# Patient Record
Sex: FEMALE | Race: WHITE | NOT HISPANIC OR LATINO | Employment: UNEMPLOYED | URBAN - METROPOLITAN AREA
[De-identification: names, ages, dates, MRNs, and addresses within clinical notes are randomized per-mention and may not be internally consistent; named-entity substitution may affect disease eponyms.]

---

## 2017-06-16 ENCOUNTER — CONVERSION ENCOUNTER (OUTPATIENT)
Dept: GENERAL RADIOLOGY | Facility: HOSPITAL | Age: 62
End: 2017-06-16

## 2017-06-28 ENCOUNTER — CONVERSION ENCOUNTER (OUTPATIENT)
Dept: MAMMOGRAPHY | Facility: HOSPITAL | Age: 62
End: 2017-06-28

## 2018-07-31 ENCOUNTER — CONVERSION ENCOUNTER (OUTPATIENT)
Dept: MAMMOGRAPHY | Facility: HOSPITAL | Age: 63
End: 2018-07-31

## 2019-01-18 ENCOUNTER — HOSPITAL ENCOUNTER (OUTPATIENT)
Dept: GENERAL RADIOLOGY | Facility: HOSPITAL | Age: 64
Discharge: HOME | End: 2019-01-18
Attending: NURSE PRACTITIONER

## 2019-01-18 LAB
CREAT BLD-MCNC: 0.9 MG/DL (ref 0.6–1.4)
GFR SERPLBLD BASED ON 1.73 SQ M-ARVRAT: >60 ML/MIN/{1.73_M2}

## 2019-01-31 ENCOUNTER — HOSPITAL ENCOUNTER (OUTPATIENT)
Dept: GENERAL RADIOLOGY | Facility: HOSPITAL | Age: 64
Discharge: HOME | End: 2019-01-31

## 2019-03-04 ENCOUNTER — HOSPITAL ENCOUNTER (OUTPATIENT)
Dept: OTHER | Facility: HOSPITAL | Age: 64
Discharge: HOME | End: 2019-03-04
Attending: INTERNAL MEDICINE

## 2019-03-04 LAB
ANION GAP SERPL CALC-SCNC: 16 MMOL/L (ref 8–19)
BASOPHILS # BLD AUTO: 0.02 10*3/UL (ref 0–0.2)
BASOPHILS NFR BLD AUTO: 0.6 % (ref 0–3)
BNP SERPL-MCNC: 120 PG/ML (ref 0–900)
BUN SERPL-MCNC: 16 MG/DL (ref 5–25)
BUN/CREAT SERPL: 17 {RATIO} (ref 6–20)
CALCIUM SERPL-MCNC: 9.8 MG/DL (ref 8.7–10.4)
CHLORIDE SERPL-SCNC: 102 MMOL/L (ref 99–111)
CONV ABS IMM GRAN: 0.01 10*3/UL (ref 0–0.2)
CONV CO2: 24 MMOL/L (ref 22–32)
CONV IMMATURE GRAN: 0.3 % (ref 0–1.8)
CONV RHEUMATOID FACTOR IGM: <10 [IU]/ML (ref 0–14)
CREAT UR-MCNC: 0.96 MG/DL (ref 0.5–0.9)
CRP SERPL-MCNC: 1.7 MG/L (ref 0–5)
DEPRECATED RDW RBC AUTO: 42.4 FL (ref 36.4–46.3)
EOSINOPHIL # BLD AUTO: 0.11 10*3/UL (ref 0–0.7)
EOSINOPHIL # BLD AUTO: 3.1 % (ref 0–7)
ERYTHROCYTE [DISTWIDTH] IN BLOOD BY AUTOMATED COUNT: 11.9 % (ref 11.7–14.4)
ERYTHROCYTE [SEDIMENTATION RATE] IN BLOOD: 6 MM/H (ref 0–30)
GFR SERPLBLD BASED ON 1.73 SQ M-ARVRAT: >60 ML/MIN/{1.73_M2}
GLUCOSE SERPL-MCNC: 87 MG/DL (ref 65–99)
HBA1C MFR BLD: 13.8 G/DL (ref 12–16)
HCT VFR BLD AUTO: 40.9 % (ref 37–47)
LYMPHOCYTES # BLD AUTO: 1.54 10*3/UL (ref 1–5)
MCH RBC QN AUTO: 32.8 PG (ref 27–31)
MCHC RBC AUTO-ENTMCNC: 33.7 G/DL (ref 33–37)
MCV RBC AUTO: 97.1 FL (ref 81–99)
MONOCYTES # BLD AUTO: 0.36 10*3/UL (ref 0.2–1.2)
MONOCYTES NFR BLD AUTO: 10.1 % (ref 3–10)
NEUTROPHILS # BLD AUTO: 1.51 10*3/UL (ref 2–8)
NEUTROPHILS NFR BLD AUTO: 42.5 % (ref 30–85)
NRBC CBCN: 0 % (ref 0–0.7)
OSMOLALITY SERPL CALC.SUM OF ELEC: 285 MOSM/KG (ref 273–304)
PLATELET # BLD AUTO: 211 10*3/UL (ref 130–400)
PMV BLD AUTO: 9.9 FL (ref 9.4–12.3)
POTASSIUM SERPL-SCNC: 4.9 MMOL/L (ref 3.5–5.3)
RBC # BLD AUTO: 4.21 10*6/UL (ref 4.2–5.4)
SODIUM SERPL-SCNC: 137 MMOL/L (ref 135–147)
T4 FREE SERPL-MCNC: 1.1 NG/DL (ref 0.9–1.8)
TSH SERPL-ACNC: 3.76 M[IU]/L (ref 0.27–4.2)
VARIANT LYMPHS NFR BLD MANUAL: 43.4 % (ref 20–45)
WBC # BLD AUTO: 3.55 10*3/UL (ref 4.8–10.8)

## 2019-03-05 LAB
ALDOLASE SERPL-CCNC: 7.8 U/L (ref 3.3–10.3)
CONV ANTI NUCLEAR ANTIBODY WITH REFLEX: NEGATIVE
DSDNA AB SER-ACNC: 6 IU/ML (ref 0–9)

## 2019-03-06 LAB — CCP IGA+IGG SERPL IA-ACNC: 14 UNITS (ref 0–19)

## 2019-07-31 ENCOUNTER — HOSPITAL ENCOUNTER (OUTPATIENT)
Dept: GENERAL RADIOLOGY | Facility: HOSPITAL | Age: 64
Discharge: HOME | End: 2019-07-31

## 2019-08-08 ENCOUNTER — HOSPITAL ENCOUNTER (OUTPATIENT)
Dept: OTHER | Facility: HOSPITAL | Age: 64
Discharge: HOME | End: 2019-08-08
Attending: INTERNAL MEDICINE

## 2019-08-08 LAB
ALBUMIN SERPL-MCNC: 4.6 G/DL (ref 3.5–5)
ALBUMIN/GLOB SERPL: 1.6 {RATIO} (ref 1.4–2.6)
ALP SERPL-CCNC: 53 U/L (ref 43–160)
ALT SERPL-CCNC: 196 U/L (ref 10–40)
ANION GAP SERPL CALC-SCNC: 21 MMOL/L (ref 8–19)
AST SERPL-CCNC: 98 U/L (ref 15–50)
BILIRUB SERPL-MCNC: 0.38 MG/DL (ref 0.2–1.3)
BUN SERPL-MCNC: 10 MG/DL (ref 5–25)
BUN/CREAT SERPL: 11 {RATIO} (ref 6–20)
CALCIUM SERPL-MCNC: 9.6 MG/DL (ref 8.7–10.4)
CHLORIDE SERPL-SCNC: 103 MMOL/L (ref 99–111)
CHOLEST SERPL-MCNC: 185 MG/DL (ref 107–200)
CHOLEST/HDLC SERPL: 4.1 {RATIO} (ref 3–6)
CONV CO2: 22 MMOL/L (ref 22–32)
CONV TOTAL PROTEIN: 7.5 G/DL (ref 6.3–8.2)
CREAT UR-MCNC: 0.93 MG/DL (ref 0.5–0.9)
GFR SERPLBLD BASED ON 1.73 SQ M-ARVRAT: >60 ML/MIN/{1.73_M2}
GLOBULIN UR ELPH-MCNC: 2.9 G/DL (ref 2–3.5)
GLUCOSE SERPL-MCNC: 101 MG/DL (ref 65–99)
HDLC SERPL-MCNC: 45 MG/DL (ref 40–60)
LDLC SERPL CALC-MCNC: 120 MG/DL (ref 70–100)
OSMOLALITY SERPL CALC.SUM OF ELEC: 291 MOSM/KG (ref 273–304)
POTASSIUM SERPL-SCNC: 4.9 MMOL/L (ref 3.5–5.3)
SODIUM SERPL-SCNC: 141 MMOL/L (ref 135–147)
TRIGL SERPL-MCNC: 101 MG/DL (ref 40–150)
TSH SERPL-ACNC: 3.19 M[IU]/L (ref 0.27–4.2)
VLDLC SERPL-MCNC: 20 MG/DL (ref 5–37)

## 2019-08-09 LAB — CONV THYROXINE TOTAL: 7.2 UG/DL (ref 4.5–12)

## 2019-08-16 ENCOUNTER — HOSPITAL ENCOUNTER (OUTPATIENT)
Dept: GENERAL RADIOLOGY | Facility: HOSPITAL | Age: 64
Discharge: HOME | End: 2019-08-16

## 2019-08-22 ENCOUNTER — HOSPITAL ENCOUNTER (OUTPATIENT)
Dept: GENERAL RADIOLOGY | Facility: HOSPITAL | Age: 64
Discharge: HOME | End: 2019-08-22
Attending: NURSE PRACTITIONER

## 2019-10-10 ENCOUNTER — HOSPITAL ENCOUNTER (OUTPATIENT)
Dept: OTHER | Facility: HOSPITAL | Age: 64
Setting detail: RECURRING SERIES
Discharge: HOME | End: 2019-10-10
Attending: INTERNAL MEDICINE

## 2020-01-29 LAB — GLUCOSE BLD-MCNC: 67 MG/DL (ref 65–99)

## 2020-06-23 ENCOUNTER — HOSPITAL ENCOUNTER (OUTPATIENT)
Dept: LAB | Facility: HOSPITAL | Age: 65
Discharge: HOME | End: 2020-06-23
Attending: INTERNAL MEDICINE

## 2020-06-23 LAB
ALBUMIN SERPL-MCNC: 4.6 G/DL (ref 3.5–5)
ALBUMIN/GLOB SERPL: 1.5 {RATIO} (ref 1.4–2.6)
ALP SERPL-CCNC: 48 U/L (ref 43–160)
ALT SERPL-CCNC: 122 U/L (ref 10–40)
ANION GAP SERPL CALC-SCNC: 17 MMOL/L (ref 8–19)
AST SERPL-CCNC: 75 U/L (ref 15–50)
BILIRUB SERPL-MCNC: 0.54 MG/DL (ref 0.2–1.3)
BUN SERPL-MCNC: 15 MG/DL (ref 5–25)
BUN/CREAT SERPL: 16 {RATIO} (ref 6–20)
CALCIUM SERPL-MCNC: 9.6 MG/DL (ref 8.7–10.4)
CHLORIDE SERPL-SCNC: 101 MMOL/L (ref 99–111)
CHOLEST SERPL-MCNC: 131 MG/DL (ref 107–200)
CHOLEST/HDLC SERPL: 3.4 {RATIO} (ref 3–6)
CONV CO2: 25 MMOL/L (ref 22–32)
CONV TOTAL PROTEIN: 7.7 G/DL (ref 6.3–8.2)
CREAT UR-MCNC: 0.92 MG/DL (ref 0.5–0.9)
GFR SERPLBLD BASED ON 1.73 SQ M-ARVRAT: >60 ML/MIN/{1.73_M2}
GLOBULIN UR ELPH-MCNC: 3.1 G/DL (ref 2–3.5)
GLUCOSE SERPL-MCNC: 94 MG/DL (ref 65–99)
HDLC SERPL-MCNC: 38 MG/DL (ref 40–60)
LDLC SERPL CALC-MCNC: 73 MG/DL (ref 70–100)
OSMOLALITY SERPL CALC.SUM OF ELEC: 287 MOSM/KG (ref 273–304)
POTASSIUM SERPL-SCNC: 4.6 MMOL/L (ref 3.5–5.3)
SODIUM SERPL-SCNC: 138 MMOL/L (ref 135–147)
TRIGL SERPL-MCNC: 100 MG/DL (ref 40–150)
VLDLC SERPL-MCNC: 20 MG/DL (ref 5–37)

## 2020-08-17 ENCOUNTER — HOSPITAL ENCOUNTER (OUTPATIENT)
Dept: GENERAL RADIOLOGY | Facility: HOSPITAL | Age: 65
Discharge: HOME | End: 2020-08-17
Attending: NURSE PRACTITIONER

## 2020-08-19 ENCOUNTER — HOSPITAL ENCOUNTER (OUTPATIENT)
Dept: ULTRASOUND IMAGING | Facility: HOSPITAL | Age: 65
Discharge: HOME | End: 2020-08-19
Attending: NURSE PRACTITIONER

## 2020-08-24 ENCOUNTER — HOSPITAL ENCOUNTER (OUTPATIENT)
Dept: GENERAL RADIOLOGY | Facility: HOSPITAL | Age: 65
Discharge: HOME | End: 2020-08-24
Attending: NURSE PRACTITIONER

## 2020-10-21 ENCOUNTER — OFFICE VISIT CONVERTED (OUTPATIENT)
Dept: NEUROSURGERY | Facility: CLINIC | Age: 65
End: 2020-10-21
Attending: PHYSICIAN ASSISTANT

## 2020-11-03 ENCOUNTER — HOSPITAL ENCOUNTER (OUTPATIENT)
Dept: LAB | Facility: HOSPITAL | Age: 65
Discharge: HOME | End: 2020-11-03
Attending: PHYSICIAN ASSISTANT

## 2020-11-03 LAB
25(OH)D3 SERPL-MCNC: 48.3 NG/ML (ref 30–100)
ALBUMIN SERPL-MCNC: 4.5 G/DL (ref 3.5–5)
ALBUMIN/GLOB SERPL: 1.3 {RATIO} (ref 1.4–2.6)
ALP SERPL-CCNC: 40 U/L (ref 43–160)
ALT SERPL-CCNC: 69 U/L (ref 10–40)
ANION GAP SERPL CALC-SCNC: 16 MMOL/L (ref 8–19)
AST SERPL-CCNC: 51 U/L (ref 15–50)
BASOPHILS # BLD AUTO: 0.02 10*3/UL (ref 0–0.2)
BASOPHILS NFR BLD AUTO: 0.4 % (ref 0–3)
BILIRUB SERPL-MCNC: 0.42 MG/DL (ref 0.2–1.3)
BUN SERPL-MCNC: 11 MG/DL (ref 5–25)
BUN/CREAT SERPL: 9 {RATIO} (ref 6–20)
CALCIUM SERPL-MCNC: 10.5 MG/DL (ref 8.7–10.4)
CHLORIDE SERPL-SCNC: 99 MMOL/L (ref 99–111)
CHOLEST SERPL-MCNC: 191 MG/DL (ref 107–200)
CHOLEST/HDLC SERPL: 4.2 {RATIO} (ref 3–6)
CONV ABS IMM GRAN: 0.02 10*3/UL (ref 0–0.2)
CONV CO2: 27 MMOL/L (ref 22–32)
CONV CREATININE URINE, RANDOM: 154.9 MG/DL (ref 10–300)
CONV IMMATURE GRAN: 0.4 % (ref 0–1.8)
CONV MICROALBUM.,U,RANDOM: <12 MG/L (ref 0–20)
CONV TOTAL PROTEIN: 7.9 G/DL (ref 6.3–8.2)
CREAT UR-MCNC: 1.17 MG/DL (ref 0.5–0.9)
DEPRECATED RDW RBC AUTO: 42.8 FL (ref 36.4–46.3)
EOSINOPHIL # BLD AUTO: 0.14 10*3/UL (ref 0–0.7)
EOSINOPHIL # BLD AUTO: 2.8 % (ref 0–7)
ERYTHROCYTE [DISTWIDTH] IN BLOOD BY AUTOMATED COUNT: 11.9 % (ref 11.7–14.4)
EST. AVERAGE GLUCOSE BLD GHB EST-MCNC: 126 MG/DL
GFR SERPLBLD BASED ON 1.73 SQ M-ARVRAT: 49 ML/MIN/{1.73_M2}
GLOBULIN UR ELPH-MCNC: 3.4 G/DL (ref 2–3.5)
GLUCOSE SERPL-MCNC: 98 MG/DL (ref 65–99)
HBA1C MFR BLD: 6 % (ref 3.5–5.7)
HCT VFR BLD AUTO: 44.1 % (ref 37–47)
HDLC SERPL-MCNC: 45 MG/DL (ref 40–60)
HGB BLD-MCNC: 14.7 G/DL (ref 12–16)
LDLC SERPL CALC-MCNC: 114 MG/DL (ref 70–100)
LYMPHOCYTES # BLD AUTO: 1.69 10*3/UL (ref 1–5)
LYMPHOCYTES NFR BLD AUTO: 33.2 % (ref 20–45)
MCH RBC QN AUTO: 32.5 PG (ref 27–31)
MCHC RBC AUTO-ENTMCNC: 33.3 G/DL (ref 33–37)
MCV RBC AUTO: 97.6 FL (ref 81–99)
MICROALBUMIN/CREAT UR: 7.7 MG/G{CRE} (ref 0–35)
MONOCYTES # BLD AUTO: 0.4 10*3/UL (ref 0.2–1.2)
MONOCYTES NFR BLD AUTO: 7.9 % (ref 3–10)
NEUTROPHILS # BLD AUTO: 2.82 10*3/UL (ref 2–8)
NEUTROPHILS NFR BLD AUTO: 55.3 % (ref 30–85)
NRBC CBCN: 0 % (ref 0–0.7)
OSMOLALITY SERPL CALC.SUM OF ELEC: 283 MOSM/KG (ref 273–304)
PLATELET # BLD AUTO: 237 10*3/UL (ref 130–400)
PMV BLD AUTO: 10.1 FL (ref 9.4–12.3)
POTASSIUM SERPL-SCNC: 4.6 MMOL/L (ref 3.5–5.3)
RBC # BLD AUTO: 4.52 10*6/UL (ref 4.2–5.4)
SODIUM SERPL-SCNC: 137 MMOL/L (ref 135–147)
T4 FREE SERPL-MCNC: 1 NG/DL (ref 0.9–1.8)
TRIGL SERPL-MCNC: 161 MG/DL (ref 40–150)
TSH SERPL-ACNC: 4.04 M[IU]/L (ref 0.27–4.2)
VLDLC SERPL-MCNC: 32 MG/DL (ref 5–37)
WBC # BLD AUTO: 5.09 10*3/UL (ref 4.8–10.8)

## 2020-11-06 ENCOUNTER — HOSPITAL ENCOUNTER (OUTPATIENT)
Dept: GENERAL RADIOLOGY | Facility: HOSPITAL | Age: 65
Discharge: HOME | End: 2020-11-06
Attending: PHYSICIAN ASSISTANT

## 2020-11-10 ENCOUNTER — OFFICE VISIT CONVERTED (OUTPATIENT)
Dept: NEUROSURGERY | Facility: CLINIC | Age: 65
End: 2020-11-10
Attending: NEUROLOGICAL SURGERY

## 2021-03-02 ENCOUNTER — HOSPITAL ENCOUNTER (OUTPATIENT)
Dept: LAB | Facility: HOSPITAL | Age: 66
Discharge: HOME | End: 2021-03-02
Attending: INTERNAL MEDICINE

## 2021-03-02 LAB
ALBUMIN SERPL-MCNC: 4.5 G/DL (ref 3.5–5)
ALBUMIN/GLOB SERPL: 1.3 {RATIO} (ref 1.4–2.6)
ALP SERPL-CCNC: 44 U/L (ref 43–160)
ALT SERPL-CCNC: 36 U/L (ref 10–40)
ANION GAP SERPL CALC-SCNC: 14 MMOL/L (ref 8–19)
AST SERPL-CCNC: 29 U/L (ref 15–50)
BILIRUB SERPL-MCNC: 0.28 MG/DL (ref 0.2–1.3)
BUN SERPL-MCNC: 17 MG/DL (ref 5–25)
BUN/CREAT SERPL: 19 {RATIO} (ref 6–20)
CALCIUM SERPL-MCNC: 9.8 MG/DL (ref 8.7–10.4)
CHLORIDE SERPL-SCNC: 102 MMOL/L (ref 99–111)
CHOLEST SERPL-MCNC: 208 MG/DL (ref 107–200)
CHOLEST/HDLC SERPL: 4.1 {RATIO} (ref 3–6)
CONV CO2: 28 MMOL/L (ref 22–32)
CONV TOTAL PROTEIN: 8.1 G/DL (ref 6.3–8.2)
CREAT UR-MCNC: 0.9 MG/DL (ref 0.5–0.9)
GFR SERPLBLD BASED ON 1.73 SQ M-ARVRAT: >60 ML/MIN/{1.73_M2}
GLOBULIN UR ELPH-MCNC: 3.6 G/DL (ref 2–3.5)
GLUCOSE SERPL-MCNC: 87 MG/DL (ref 65–99)
HDLC SERPL-MCNC: 51 MG/DL (ref 40–60)
LDLC SERPL CALC-MCNC: 128 MG/DL (ref 70–100)
OSMOLALITY SERPL CALC.SUM OF ELEC: 291 MOSM/KG (ref 273–304)
POTASSIUM SERPL-SCNC: 4.3 MMOL/L (ref 3.5–5.3)
SODIUM SERPL-SCNC: 140 MMOL/L (ref 135–147)
TRIGL SERPL-MCNC: 146 MG/DL (ref 40–150)
VLDLC SERPL-MCNC: 29 MG/DL (ref 5–37)

## 2021-03-03 ENCOUNTER — HOSPITAL ENCOUNTER (OUTPATIENT)
Dept: LAB | Facility: HOSPITAL | Age: 66
Discharge: HOME | End: 2021-03-03
Attending: PHYSICIAN ASSISTANT

## 2021-03-03 LAB
25(OH)D3 SERPL-MCNC: 43.1 NG/ML (ref 30–100)
BASOPHILS # BLD AUTO: 0.02 10*3/UL (ref 0–0.2)
BASOPHILS NFR BLD AUTO: 0.4 % (ref 0–3)
CONV ABS IMM GRAN: 0.02 10*3/UL (ref 0–0.2)
CONV IMMATURE GRAN: 0.4 % (ref 0–1.8)
DEPRECATED RDW RBC AUTO: 42.5 FL (ref 36.4–46.3)
EOSINOPHIL # BLD AUTO: 0.17 10*3/UL (ref 0–0.7)
EOSINOPHIL # BLD AUTO: 3.3 % (ref 0–7)
ERYTHROCYTE [DISTWIDTH] IN BLOOD BY AUTOMATED COUNT: 11.9 % (ref 11.7–14.4)
EST. AVERAGE GLUCOSE BLD GHB EST-MCNC: 111 MG/DL
HBA1C MFR BLD: 5.5 % (ref 3.5–5.7)
HCT VFR BLD AUTO: 43 % (ref 37–47)
HGB BLD-MCNC: 14.2 G/DL (ref 12–16)
LYMPHOCYTES # BLD AUTO: 1.58 10*3/UL (ref 1–5)
LYMPHOCYTES NFR BLD AUTO: 30.9 % (ref 20–45)
MCH RBC QN AUTO: 32.1 PG (ref 27–31)
MCHC RBC AUTO-ENTMCNC: 33 G/DL (ref 33–37)
MCV RBC AUTO: 97.3 FL (ref 81–99)
MONOCYTES # BLD AUTO: 0.44 10*3/UL (ref 0.2–1.2)
MONOCYTES NFR BLD AUTO: 8.6 % (ref 3–10)
NEUTROPHILS # BLD AUTO: 2.88 10*3/UL (ref 2–8)
NEUTROPHILS NFR BLD AUTO: 56.4 % (ref 30–85)
NRBC CBCN: 0 % (ref 0–0.7)
PLATELET # BLD AUTO: 221 10*3/UL (ref 130–400)
PMV BLD AUTO: 9.8 FL (ref 9.4–12.3)
RBC # BLD AUTO: 4.42 10*6/UL (ref 4.2–5.4)
T4 FREE SERPL-MCNC: 1.1 NG/DL (ref 0.9–1.8)
TSH SERPL-ACNC: 2.96 M[IU]/L (ref 0.27–4.2)
WBC # BLD AUTO: 5.11 10*3/UL (ref 4.8–10.8)

## 2021-05-10 NOTE — H&P
History and Physical      Patient Name: Gloria Stewart   Patient ID: [id number]   Sex: Female   YOB: 1955        Visit Date: October 21, 2020    Provider: Scarlett Aceves PA-C   Location: [facility]   Location Address: [address] [place] [address]  [id number]   Location Phone: [phone]          Chief Complaint  · Neck Pain with Headaches      History Of Present Illness  The patient is a 64 year old /White female, who presents on referral from Nayeli ALEXIS, for a neurosurgical evaluation for a history of neck pain and paresthesias.   The neck pain is localized to the posterior cervical region and has been present for 2 years. It is 10/10 in severity and radiates into the right and left arm in a non-specific distribution. The pain is described as being constant and it is generally worse at night. She is having difficulty with sleep due to the pain. The patient states the pain is aggravated by raising arms above head. She reports the pain is alleviated by rest and lying down. The paresthesias developed 6 months ago and are localized to the bilateral C6, C8, and non-specific dermatomal distribution.   The onset of the symptoms was not associated with any specific event or activity.   She has no additional symptoms. The patient's past medical history is notable for prior cervical spine surgery. The patient underwent an ACDF at C5-6 approximately 10 years ago.   RECENT INTERVENTIONS:  She reports undergoing recent physical therapy. The physical therapy has not helped.   INFORMATION REVIEWED:  The following information was reviewed: radiology reports and radiographic images. The cervical X-rays revealed C5/6 fusion. C4/5 DDD with spur at C4..      Pt is also have headaches that originate from neck.       Past Medical History  Cervicalgia; Diabetes mellitus; Hypertension; Lumbago         Past Surgical History  Cervical Fusion; Cholecystectomy;  "Discectomy; Hysterectomy; Stomach surgery         Medication List  Dyazide 37.5-25 mg oral capsule; lisinopril 10 mg oral tablet; Ozempic 0.25 mg or 0.5 mg(2 mg/1.5 mL) subcutaneous pen injector; pravastatin 20 mg oral tablet; Toprol XL 50 mg oral tablet extended release 24 hr         Allergy List  NO KNOWN DRUG ALLERGIES         Family Medical History  Family history of heart disease         Social History  Tobacco (Never)         Review of Systems  · Constitutional  o Denies  o : chills, excessive sweating, fatigue, fever, sycope/passing out, weight gain, weight loss  · Eyes  o Denies  o : changes in vision, blurry vision, double vision  · HENT  o Denies  o : loss of hearing, ringing in the ears, ear aches, sore throat, nasal congestion, sinus pain, nose bleeds, seasonal allergies  · Cardiovascular  o Denies  o : blood clots, swollen legs, anemia, easy burising or bleeding, transfusions  · Respiratory  o Admits  o : shortness of breath  o Denies  o : dry cough, productive cough, pneumonia, COPD  · Gastrointestinal  o Denies  o : difficulty swallowing, reflux  · Genitourinary  o Denies  o : incontinence  · Neurologic  o Admits  o : headache, loss of balance, difficulty with sleep, numbness/tingling/paresthesia   o Denies  o : seizure, stroke, tremor, falls, dizziness/vertigo, difficulty with coordination, difficulty with dexterity, weakness  · Musculoskeletal  o Admits  o : neck stiffness/pain, muscle aches, low back pain  o Denies  o : swollen lymph nodes, joint pain, weakness, spasms, sciatica, pain radiating in arm, pain radiating in leg  · Endocrine  o Denies  o : diabetes, thyroid disorder  · Psychiatric  o Denies  o : anxiety, depression      Vitals  Date Time BP Position Site L\R Cuff Size HR RR TEMP (F) WT  HT  BMI kg/m2 BSA m2 O2 Sat FR L/min FiO2 HC       10/21/2020 11:08 AM        96  5'  4\"   99 %            Physical Examination  · Constitutional  o Appearance  o : well-nourished, well developed, alert, " in no acute distress  · Neck  o Inspection/Palpation  o : normal appearance, no masses, trachea midline. Tenderness of Cspine paraspinals.   o Range of Motion  o : cervical range of motion within normal limits  · Respiratory  o Respiratory Effort  o : breathing unlabored  · Cardiovascular  o Peripheral Vascular System  o :   § Extremities  § : no edema or cyanosis  · Musculoskeletal  o Spine  o :   § Stability  § : no subluxations present  § Muscle Strength/Tone  § : paraspinal muscle strength within normal limits, paraspinal muscle tone within normal limits  o Right Upper Extremity  o :   § Inspection/Palpation  § : no tenderness to palpation  § Joint Stability  § : shoulder, elbow and wrist joint stability normal  § Range of Motion  § : range of motion normal, no joint crepitus or pain with motion present,shoulder negative  o Left Upper Extremity  o :   § Inspection/Palpation  § : no tenderness to palpation  § Joint Stability  § : shoulder, elbow and wrist joint stability normal  § Range of Motion  § : range of motion normal, no joint crepitus present, no pain with joint motion, shoulder negative  · Skin and Subcutaneous Tissue  o Neck  o : no lesions or areas of discoloration  · Neurologic  o Mental Status Examination  o :   § Orientation  § : alert and oriented to person, place, time and events  o Motor Examination  o :   § RUE Strength  § : strength normal  § RUE Motor Function  § : tone normal, muscle bulk normal  § LUE Strength  § : strength normal  § LUE Motor Function  § : tone normal, muscle bulk normal  o Reflexes  o :   § RUE  § : 2/4 in biceps/triceps/brachioradialis, Bonilla sign negative  § LUE  § : 2/4 in biceps/triceps/brachioradialis, Bonilla sign negative  o Sensation  o :   § Light Touch  § : sensation intact to light touch in extremities  o Gait and Station  o :   § Gait Screening  § : normal gait, able to stand without difficulty  · Psychiatric  o Mood and Affect  o : mood normal, affect  appropriate              Assessment  · Cervicalgia     723.1/M54.2  · Cervical radiculopathy     723.4/M54.12      Plan  · Orders  o MRI cervical spine wo contrast (54015) - 723.1/M54.2, 723.4/M54.12 - 10/21/2020   MARBELLA   · Medications  o Medications have been Reconciled  o Transition of Care or Provider Policy  · Instructions  o Encouraged to follow-up with Primary Care Provider for preventative care.  o The ROS and the PFSH were reviewed at today's visit.  o I have discussed the risks and benefits of surgery versus physical therapy and other conservative forms of treatment.  o Handouts provided: Cervical Exercises.  o Call or return if symptoms worsen or persist.  o Will order MRI of Cspine and return afterwards. Has had previous surgery at C5/6.   o Electronically Identified Patient Education Materials Provided Electronically  · Disposition  o Call or Return if symptoms worsen or persist.            Electronically Signed by: Scarlett Aceves PA-C -Author on October 21, 2020 12:07:22 PM

## 2021-05-13 NOTE — PROGRESS NOTES
Progress Note      Patient Name: Gloria Stewart   Patient ID: [id number]   Sex: Female   YOB: 1955    Referring Provider: Nayeli Barrett [facility]    Visit Date: November 10, 2020    Provider: Wilson Priest MD   Location: [facility]   Location Address: [address] [place] [address]  [id number]   Location Phone: [phone]          Chief Complaint  · Follow-up     Here to discuss MRI results.       History Of Present Illness  The patient is a 64 year old /White female who is in the office for followup appointment. She is 10 years out from C5-6 fusion. She gets pain in the neck up to the head. She does have some pain into the thumb and numbness into the left greater than right forearm. Her neck pain keeps her up at night. She has spinal stenosis at C4-5 with questionable early signal change. She has headaches which appears to related to the neck pain.       Past Medical History  Cervicalgia; Diabetes mellitus; Hypertension; Lumbago         Past Surgical History  Cervical Fusion; Cholecystectomy; Discectomy; Hysterectomy; Stomach surgery         Medication List  lisinopril 10 mg oral tablet; Ozempic 0.25 mg or 0.5 mg(2 mg/1.5 mL) subcutaneous pen injector; pravastatin 20 mg oral tablet; Toprol XL 50 mg oral tablet extended release 24 hr         Allergy List  NO KNOWN DRUG ALLERGIES       Allergies Reconciled  Family Medical History  Family history of heart disease         Social History  Tobacco (Never)         Review of Systems  · Constitutional  o Denies  o : chills, excessive sweating, fatigue, fever, sycope/passing out, weight gain, weight loss  · Eyes  o Denies  o : changes in vision, blurry vision, double vision  · HENT  o Denies  o : loss of hearing, ringing in the ears, ear aches, sore throat, nasal congestion, sinus pain, nose bleeds, seasonal allergies  · Cardiovascular  o Denies  o : blood clots, swollen legs, anemia, easy burising or bleeding,  "transfusions  · Respiratory  o Denies  o : shortness of breath, dry cough, productive cough, pneumonia, COPD  · Gastrointestinal  o Denies  o : difficulty swallowing, reflux  · Genitourinary  o Denies  o : incontinence  · Neurologic  o Admits  o : headache, dizziness/vertigo, difficulty with sleep, numbness/tingling/paresthesia   o Denies  o : seizure, stroke, tremor, loss of balance, falls, difficulty with coordination, difficulty with dexterity, weakness  · Musculoskeletal  o Admits  o : neck stiffness/pain, low back pain  o Denies  o : swollen lymph nodes, muscle aches, joint pain, weakness, spasms, sciatica, pain radiating in arm, pain radiating in leg  · Endocrine  o Denies  o : diabetes, thyroid disorder  · Psychiatric  o Denies  o : anxiety, depression  · All Others Negative      Vitals  Date Time BP Position Site L\R Cuff Size HR RR TEMP (F) WT  HT  BMI kg/m2 BSA m2 O2 Sat FR L/min FiO2        11/10/2020 02:15 PM        97.6 162lbs 9oz 5'  4\" 27.9 1.82             Physical Examination  · Constitutional  o Appearance  o : well-nourished, well developed, alert, in no acute distress  · Neck  o Inspection/Palpation  o : normal appearance, no masses, trachea midline. Tenderness of Cspine paraspinals.   o Range of Motion  o : cervical range of motion within normal limits  · Respiratory  o Respiratory Effort  o : breathing unlabored  · Cardiovascular  o Peripheral Vascular System  o :   § Extremities  § : no edema or cyanosis  · Skin and Subcutaneous Tissue  o Neck  o : no lesions or areas of discoloration  · Psychiatric  o Mood and Affect  o : mood normal, affect appropriate     No Hoffmans or Clonus.   Reflexes 2/4.               Assessment  · Cervicalgia     723.1/M54.2  · Cervical radiculopathy     723.4/M54.12  · Cervical spinal stenosis     723.0/M48.02  C4-5 most notably (above fusion) with question of signal change without signs of myelopathy on physical exam.       Plan  · Medications  o Fioricet " -40 mg oral capsule   SIG: take 1 capsule by oral route every 8 hours as needed tension headache   DISP: (30) Capsule with 0 refills  Prescribed on 11/10/2020     o Medications have been Reconciled  o Transition of Care or Provider Policy  · Instructions  o The ROS and the PFSH were reviewed at today's visit.  o Call or return to office if symptoms worsen or persist.   o We will try fioricet for the tension type headaches.   o She currently has no signs of myelopathy on physical exam. We will see her back in 3 months to assure not developing any signs of myelopathy.   · Disposition  o Return to clinic in 3 months.            Electronically Signed by: Wilson Priest MD -Author on November 10, 2020 03:20:59 PM

## 2021-05-14 VITALS — HEIGHT: 64 IN | OXYGEN SATURATION: 99 % | TEMPERATURE: 96 F

## 2021-05-14 VITALS — TEMPERATURE: 97.6 F | WEIGHT: 162.56 LBS | BODY MASS INDEX: 27.75 KG/M2 | HEIGHT: 64 IN

## 2021-05-22 ENCOUNTER — TRANSCRIBE ORDERS (OUTPATIENT)
Dept: MAMMOGRAPHY | Facility: HOSPITAL | Age: 66
End: 2021-05-22

## 2021-05-22 DIAGNOSIS — Z12.39 BREAST SCREENING: Primary | ICD-10-CM

## 2021-08-19 ENCOUNTER — PREP FOR SURGERY (OUTPATIENT)
Dept: OTHER | Facility: HOSPITAL | Age: 66
End: 2021-08-19

## 2021-08-19 ENCOUNTER — OFFICE VISIT (OUTPATIENT)
Dept: NEUROSURGERY | Facility: CLINIC | Age: 66
End: 2021-08-19

## 2021-08-19 VITALS
SYSTOLIC BLOOD PRESSURE: 129 MMHG | BODY MASS INDEX: 28.42 KG/M2 | HEIGHT: 64 IN | WEIGHT: 166.5 LBS | DIASTOLIC BLOOD PRESSURE: 82 MMHG

## 2021-08-19 DIAGNOSIS — M48.02 SPINAL STENOSIS IN CERVICAL REGION: Primary | ICD-10-CM

## 2021-08-19 PROCEDURE — 99213 OFFICE O/P EST LOW 20 MIN: CPT | Performed by: NEUROLOGICAL SURGERY

## 2021-08-19 RX ORDER — METOPROLOL SUCCINATE 50 MG/1
2 TABLET, EXTENDED RELEASE ORAL EVERY EVENING
COMMUNITY
Start: 2021-08-17

## 2021-08-19 RX ORDER — SEMAGLUTIDE 1.34 MG/ML
0.2 INJECTION, SOLUTION SUBCUTANEOUS
COMMUNITY
End: 2022-03-14 | Stop reason: SDUPTHER

## 2021-08-19 RX ORDER — CEFAZOLIN SODIUM 2 G/100ML
2 INJECTION, SOLUTION INTRAVENOUS ONCE
Status: CANCELLED | OUTPATIENT
Start: 2021-08-19 | End: 2021-08-19

## 2021-08-19 RX ORDER — LISINOPRIL 10 MG/1
5 TABLET ORAL DAILY
COMMUNITY
Start: 2021-08-17 | End: 2022-08-17

## 2021-08-19 RX ORDER — PRAVASTATIN SODIUM 20 MG
TABLET ORAL
COMMUNITY
End: 2021-09-20

## 2021-08-19 NOTE — PROGRESS NOTES
"  Gloria Stewart is a 65 y.o. female that presents with Neck Pain       She has noticed worsening pain across the shoulders and feels \"like it is in the bone.\" She has significant continued headaches. Her worst pain is      Review of Systems   Musculoskeletal: Positive for arthralgias, back pain, myalgias, neck pain and neck stiffness.   Neurological: Positive for numbness.   All other systems reviewed and are negative.       Vitals:    08/19/21 1028   BP: 129/82        Physical Exam  Cardiovascular:      Comments: No edema  Pulmonary:      Effort: Pulmonary effort is normal.   Neurological:      Mental Status: She is alert.      Sensory: No sensory deficit.      Motor: No weakness.      Deep Tendon Reflexes: Reflexes normal (2/4, -Hoffmans).   Psychiatric:         Mood and Affect: Mood normal.             Assessment and Plan {CC Problem List  Visit Diagnosis  ROS  Review (Popup)  Forseva Maintenance  Quality  BestPractice  Medications  SmartSets  SnapShot Encounters  Media :23}   Problem List Items Addressed This Visit     None      Visit Diagnoses     Spinal stenosis in cervical region    -  Primary    C4-5 with questionable early myelomalacia. No myelopathy.        C4-5 ACDF would mainly help reduce her risk of spinal cord injury, but not likely the headache and neck pain.     She would like to proceed with ACDF.  Follow Up {Instructions Charge Capture  Follow-up Communications :23}   No follow-ups on file.  "

## 2021-08-26 ENCOUNTER — HOSPITAL ENCOUNTER (OUTPATIENT)
Dept: MAMMOGRAPHY | Facility: HOSPITAL | Age: 66
Discharge: HOME | End: 2021-08-26
Admitting: PHYSICIAN ASSISTANT

## 2021-08-26 DIAGNOSIS — Z12.39 BREAST SCREENING: ICD-10-CM

## 2021-08-26 PROCEDURE — 77067 SCR MAMMO BI INCL CAD: CPT | Performed by: RADIOLOGY

## 2021-08-26 PROCEDURE — 77063 BREAST TOMOSYNTHESIS BI: CPT

## 2021-08-26 PROCEDURE — 77063 BREAST TOMOSYNTHESIS BI: CPT | Performed by: RADIOLOGY

## 2021-08-26 PROCEDURE — 77067 SCR MAMMO BI INCL CAD: CPT

## 2021-09-14 ENCOUNTER — TELEPHONE (OUTPATIENT)
Dept: INTERNAL MEDICINE | Facility: CLINIC | Age: 66
End: 2021-09-14

## 2021-09-14 ENCOUNTER — LAB (OUTPATIENT)
Dept: LAB | Facility: HOSPITAL | Age: 66
End: 2021-09-14

## 2021-09-14 DIAGNOSIS — E78.5 HYPERLIPIDEMIA, UNSPECIFIED HYPERLIPIDEMIA TYPE: ICD-10-CM

## 2021-09-14 DIAGNOSIS — R53.83 FATIGUE, UNSPECIFIED TYPE: ICD-10-CM

## 2021-09-14 DIAGNOSIS — I10 HYPERTENSION, UNSPECIFIED TYPE: ICD-10-CM

## 2021-09-14 DIAGNOSIS — E55.9 VITAMIN D DEFICIENCY: ICD-10-CM

## 2021-09-14 DIAGNOSIS — I10 HYPERTENSION, UNSPECIFIED TYPE: Primary | ICD-10-CM

## 2021-09-14 LAB
25(OH)D3 SERPL-MCNC: 58.8 NG/ML
ALBUMIN SERPL-MCNC: 4.6 G/DL (ref 3.5–5.2)
ALBUMIN/GLOB SERPL: 1.7 G/DL
ALP SERPL-CCNC: 35 U/L (ref 39–117)
ALT SERPL W P-5'-P-CCNC: 21 U/L (ref 1–33)
ANION GAP SERPL CALCULATED.3IONS-SCNC: 8.5 MMOL/L (ref 5–15)
AST SERPL-CCNC: 20 U/L (ref 1–32)
BASOPHILS # BLD AUTO: 0.02 10*3/MM3 (ref 0–0.2)
BASOPHILS NFR BLD AUTO: 0.5 % (ref 0–1.5)
BILIRUB SERPL-MCNC: 0.4 MG/DL (ref 0–1.2)
BUN SERPL-MCNC: 13 MG/DL (ref 8–23)
BUN/CREAT SERPL: 14.4 (ref 7–25)
CALCIUM SPEC-SCNC: 9.5 MG/DL (ref 8.6–10.5)
CHLORIDE SERPL-SCNC: 101 MMOL/L (ref 98–107)
CHOLEST SERPL-MCNC: 172 MG/DL (ref 0–200)
CO2 SERPL-SCNC: 27.5 MMOL/L (ref 22–29)
CREAT SERPL-MCNC: 0.9 MG/DL (ref 0.57–1)
DEPRECATED RDW RBC AUTO: 42.4 FL (ref 37–54)
EOSINOPHIL # BLD AUTO: 0.09 10*3/MM3 (ref 0–0.4)
EOSINOPHIL NFR BLD AUTO: 2.1 % (ref 0.3–6.2)
ERYTHROCYTE [DISTWIDTH] IN BLOOD BY AUTOMATED COUNT: 11.9 % (ref 12.3–15.4)
GFR SERPL CREATININE-BSD FRML MDRD: 63 ML/MIN/1.73
GLOBULIN UR ELPH-MCNC: 2.7 GM/DL
GLUCOSE SERPL-MCNC: 82 MG/DL (ref 65–99)
HCT VFR BLD AUTO: 41.4 % (ref 34–46.6)
HDLC SERPL-MCNC: 42 MG/DL (ref 40–60)
HGB BLD-MCNC: 14 G/DL (ref 12–15.9)
IMM GRANULOCYTES # BLD AUTO: 0.01 10*3/MM3 (ref 0–0.05)
IMM GRANULOCYTES NFR BLD AUTO: 0.2 % (ref 0–0.5)
LDLC SERPL CALC-MCNC: 105 MG/DL (ref 0–100)
LDLC/HDLC SERPL: 2.42 {RATIO}
LYMPHOCYTES # BLD AUTO: 1.53 10*3/MM3 (ref 0.7–3.1)
LYMPHOCYTES NFR BLD AUTO: 36.2 % (ref 19.6–45.3)
MCH RBC QN AUTO: 32.7 PG (ref 26.6–33)
MCHC RBC AUTO-ENTMCNC: 33.8 G/DL (ref 31.5–35.7)
MCV RBC AUTO: 96.7 FL (ref 79–97)
MONOCYTES # BLD AUTO: 0.41 10*3/MM3 (ref 0.1–0.9)
MONOCYTES NFR BLD AUTO: 9.7 % (ref 5–12)
NEUTROPHILS NFR BLD AUTO: 2.17 10*3/MM3 (ref 1.7–7)
NEUTROPHILS NFR BLD AUTO: 51.3 % (ref 42.7–76)
NRBC BLD AUTO-RTO: 0 /100 WBC (ref 0–0.2)
PLATELET # BLD AUTO: 216 10*3/MM3 (ref 140–450)
PMV BLD AUTO: 9.8 FL (ref 6–12)
POTASSIUM SERPL-SCNC: 4.4 MMOL/L (ref 3.5–5.2)
PROT SERPL-MCNC: 7.3 G/DL (ref 6–8.5)
RBC # BLD AUTO: 4.28 10*6/MM3 (ref 3.77–5.28)
SODIUM SERPL-SCNC: 137 MMOL/L (ref 136–145)
T4 FREE SERPL-MCNC: 0.99 NG/DL (ref 0.93–1.7)
TRIGL SERPL-MCNC: 141 MG/DL (ref 0–150)
TSH SERPL DL<=0.05 MIU/L-ACNC: 2.28 UIU/ML (ref 0.27–4.2)
VLDLC SERPL-MCNC: 25 MG/DL (ref 5–40)
WBC # BLD AUTO: 4.23 10*3/MM3 (ref 3.4–10.8)

## 2021-09-14 PROCEDURE — 84443 ASSAY THYROID STIM HORMONE: CPT

## 2021-09-14 PROCEDURE — 85025 COMPLETE CBC W/AUTO DIFF WBC: CPT

## 2021-09-14 PROCEDURE — 84439 ASSAY OF FREE THYROXINE: CPT

## 2021-09-14 PROCEDURE — 82306 VITAMIN D 25 HYDROXY: CPT

## 2021-09-14 PROCEDURE — 80053 COMPREHEN METABOLIC PANEL: CPT

## 2021-09-14 PROCEDURE — 80061 LIPID PANEL: CPT

## 2021-09-14 PROCEDURE — 36415 COLL VENOUS BLD VENIPUNCTURE: CPT

## 2021-09-20 ENCOUNTER — OFFICE VISIT (OUTPATIENT)
Dept: INTERNAL MEDICINE | Facility: CLINIC | Age: 66
End: 2021-09-20

## 2021-09-20 VITALS
TEMPERATURE: 96.4 F | SYSTOLIC BLOOD PRESSURE: 138 MMHG | HEIGHT: 64 IN | HEART RATE: 69 BPM | WEIGHT: 169 LBS | BODY MASS INDEX: 28.85 KG/M2 | DIASTOLIC BLOOD PRESSURE: 80 MMHG | OXYGEN SATURATION: 98 %

## 2021-09-20 DIAGNOSIS — E55.9 VITAMIN D DEFICIENCY: ICD-10-CM

## 2021-09-20 DIAGNOSIS — M25.512 BILATERAL SHOULDER PAIN, UNSPECIFIED CHRONICITY: ICD-10-CM

## 2021-09-20 DIAGNOSIS — E78.2 MIXED HYPERLIPIDEMIA: Chronic | ICD-10-CM

## 2021-09-20 DIAGNOSIS — I10 ESSENTIAL HYPERTENSION: Chronic | ICD-10-CM

## 2021-09-20 DIAGNOSIS — M25.511 BILATERAL SHOULDER PAIN, UNSPECIFIED CHRONICITY: ICD-10-CM

## 2021-09-20 DIAGNOSIS — E11.9 TYPE 2 DIABETES MELLITUS WITHOUT COMPLICATION, UNSPECIFIED WHETHER LONG TERM INSULIN USE (HCC): Primary | Chronic | ICD-10-CM

## 2021-09-20 DIAGNOSIS — M48.02 SPINAL STENOSIS IN CERVICAL REGION: ICD-10-CM

## 2021-09-20 PROBLEM — E78.5 HYPERLIPIDEMIA: Status: ACTIVE | Noted: 2021-09-20

## 2021-09-20 PROBLEM — R00.0 TACHYCARDIA, UNSPECIFIED: Status: ACTIVE | Noted: 2019-08-30

## 2021-09-20 PROBLEM — Z98.1 S/P CERVICAL SPINAL FUSION: Status: ACTIVE | Noted: 2017-02-07

## 2021-09-20 PROBLEM — I26.99 PULMONARY EMBOLISM: Status: ACTIVE | Noted: 2019-08-30

## 2021-09-20 PROCEDURE — 99214 OFFICE O/P EST MOD 30 MIN: CPT | Performed by: NURSE PRACTITIONER

## 2021-09-20 RX ORDER — PROGESTERONE 100 MG/1
CAPSULE ORAL
COMMUNITY
End: 2021-09-20

## 2021-09-20 RX ORDER — METOPROLOL SUCCINATE 50 MG/1
TABLET, EXTENDED RELEASE ORAL EVERY 24 HOURS
COMMUNITY
End: 2021-09-20

## 2021-09-20 RX ORDER — DICLOFENAC SODIUM 75 MG/1
75 TABLET, DELAYED RELEASE ORAL
COMMUNITY
End: 2021-09-20

## 2021-09-20 RX ORDER — PRAVASTATIN SODIUM 20 MG
1 TABLET ORAL EVERY EVENING
COMMUNITY

## 2021-09-20 RX ORDER — ESTRADIOL 0.03 MG/D
FILM, EXTENDED RELEASE TRANSDERMAL
COMMUNITY
End: 2021-09-20

## 2021-09-20 RX ORDER — KETOCONAZOLE 20 MG/ML
SHAMPOO TOPICAL
COMMUNITY
Start: 2021-09-01 | End: 2022-08-17

## 2021-09-20 RX ORDER — HYDROCODONE BITARTRATE AND ACETAMINOPHEN 5; 325 MG/1; MG/1
TABLET ORAL
Qty: 20 TABLET | Refills: 0 | Status: SHIPPED | OUTPATIENT
Start: 2021-09-20 | End: 2022-03-14

## 2021-09-20 RX ORDER — SEMAGLUTIDE 1.34 MG/ML
INJECTION, SOLUTION SUBCUTANEOUS
COMMUNITY
End: 2022-03-14 | Stop reason: SDUPTHER

## 2021-09-20 RX ORDER — ONDANSETRON 4 MG/1
4 TABLET, ORALLY DISINTEGRATING ORAL EVERY 8 HOURS PRN
Qty: 15 TABLET | Refills: 3 | Status: SHIPPED | OUTPATIENT
Start: 2021-09-20 | End: 2021-12-21

## 2021-09-20 RX ORDER — TRIAMTERENE AND HYDROCHLOROTHIAZIDE 37.5; 25 MG/1; MG/1
CAPSULE ORAL
COMMUNITY
End: 2021-09-20

## 2021-09-20 RX ORDER — LISINOPRIL 10 MG/1
TABLET ORAL EVERY 24 HOURS
COMMUNITY
End: 2021-09-20

## 2021-09-20 NOTE — PROGRESS NOTES
Chief Complaint  Follow-up (right clavical pain, keep pt awake at night, pt cannot tolerate pain any longer )    Subjective    History of Present Illness:  Gloria Stewart presents to [facility] for complaint of right clavicle pain.  She states the pain is keeping her awake at night.    She is 10 years out from C5-6 fusion. She has had a Discectomy. Follows with Dr. Priest.    Objective   Vital Signs:   There were no vitals taken for this visit.      Physical Exam :  General: Well nourished, well hydrated, in no acute distress  HEENT: Conjunctiva clear, no exudate bilateral  Cardiovascular: S1 S2, regular rate and rhythm, no murmur  Respiratory: Clear to auscultation bilateral, no wheezes, rhonchi, or rales  Chest: Normal shape, no deformity, normal work of breathing  Musculoskeletal: No joint deformities, tenderness, or crepitations; full, active ROM without pain   Neurological: Alert, conversing normally  Psychological: Good eye contact, mood good, and judgment intact          Assessment and Plan:  Diagnoses and all orders for this visit:    1. Spinal stenosis in cervical region (Primary)          Follow Up:  Patient was given instructions and counseling regarding condition. No follow-ups on file.

## 2021-09-20 NOTE — PROGRESS NOTES
Chief Complaint  Follow-up (right clavical pain, keep pt awake at night, pt cannot tolerate pain any longer )    Subjective    History of Present Illness:  Gloria Stewart presents to [facility]  for follow-up chronic disease management.  The patient is not having any medication side effects. Recent labs were reviewed. Denies chest pain, shortness of air, abdominal pain, or change in bowel habits.    She is complaining of bilateral shoulder pain that has become severe in the last 2 days.  She has been unable to sleep due to the severity of pain.  She has had this pain for 9 months but it has been worsening.  She states that it is an ache and shooting pain.  She has used Tylenol with no relief. Positive for nausea when the pain is at its worse.  She states that Dr. Priest does not believe it is associated with her cervical disc problem.    Followed by Dr. Priest, Dr. Tay (refills pravastatin and metoprolol), sees gynecologist and uses compound hormones, and dermatologist.  Has had chronic kidney disease stage III and knows to avoid NSAIDs.  On Toprol XL for palpitations.  Family history of early MIs with 3 brothers at age 46, 51, and 62.  History of abnormal LFTs/fatty liver per ultrasound.  Status post hysterectomy 1994, status post neck and back surgery, status post gastric wrap, status post cholecystectomy.  Status post melanoma and sees dermatology regularly. Carotid Doppler scan 8/2020 no stenosis.  Colonoscopy 2017 and normal.  Tetanus vaccine 2020.    Component      Latest Ref Rng & Units 11/3/2020 3/2/2021 9/14/2021   Total Cholesterol      0 - 200 mg/dL 191 208 (H) 172   Triglycerides      0 - 150 mg/dL 161 (H) 146 141   HDL Cholesterol      40 - 60 mg/dL 45 51 42   LDL Cholesterol      0 - 100 mg/dL 114 (H) 128 (H) 105 (H)   VLDL Cholesterol      5 - 40 mg/dL 32 29 25   LDL/HDL Ratio         2.42       Objective   Vital Signs:   /80 (BP Location: Right arm,  "Patient Position: Sitting, Cuff Size: Adult)   Pulse 69   Temp 96.4 °F (35.8 °C) (Temporal)   Ht 162.6 cm (64\")   Wt 76.7 kg (169 lb)   SpO2 98%   BMI 29.01 kg/m²       Physical Exam :  General: Well nourished, well hydrated, in no acute distress  HEENT: Conjunctiva clear, no exudate bilateral  Neck: Supple, non-tender, no adenopathy, no bruit  Skin: Warm and dry  Cardiovascular: S1 S2, regular rate and rhythm, no murmur  Respiratory: Clear to auscultation bilateral, no wheezes, rhonchi, or rales  Chest: Normal shape, no deformity, normal work of breathing  Shoulders: Bilateral tenderness to AC and deltoids; active full range of motion; equal   Extremities: No lower extremity edema  Neurological: Alert, conversing normally  Psychological: Good eye contact, mood good, and judgment intact        Assessment and Plan:  Diagnoses and all orders for this visit:    1. Type 2 diabetes mellitus without complication, unspecified whether long term insulin use (HCC) (Primary)  Comments:  Stable on medication and to continue current treatment plan.  Orders:  -     CBC & Differential; Future  -     T4, Free; Future  -     TSH; Future  -     Hemoglobin A1c; Future    2. Essential hypertension  Comments:  Stable on medication and to continue current treatment plan.  Orders:  -     Comprehensive Metabolic Panel; Future    3. Mixed hyperlipidemia  Comments:  Stable on current medication and treatment plan.  Will monitor.  Orders:  -     Lipid Panel; Future    4. Bilateral shoulder pain, unspecified chronicity  Comments:  Education on medication and treatment plan.AR reviewed. Controlled substance discussed; security, disposal, addiction potential, etc.  Orders:  -     HYDROcodone-acetaminophen (Norco) 5-325 MG per tablet; Take 1 or 2 tablets every 6 hours as needed for severe pain.  Dispense: 20 tablet; Refill: 0  -     diclofenac (VOLTAREN) 50 MG EC tablet; Take 1 tablet by mouth 2 (Two) Times a Day for 14 days.  " Dispense: 28 tablet; Refill: 0  -     ondansetron ODT (Zofran ODT) 4 MG disintegrating tablet; Place 1 tablet on the tongue Every 8 (Eight) Hours As Needed for Nausea or Vomiting.  Dispense: 15 tablet; Refill: 3  -     Ambulatory Referral to Orthopedic Surgery    5. Vitamin D deficiency  Comments:  Stable to continue to monitor.  Orders:  -     Vitamin D 25 Hydroxy; Future    6. Spinal stenosis in cervical region  Comments:  Follows with Dr. Priest.  Plan is to do surgery in the future.      Follow Up:  Patient was given instructions and counseling regarding condition. Return in about 6 months (around 3/20/2022) for Recheck.

## 2021-09-22 ENCOUNTER — OFFICE VISIT (OUTPATIENT)
Dept: ORTHOPEDIC SURGERY | Facility: CLINIC | Age: 66
End: 2021-09-22

## 2021-09-22 VITALS — OXYGEN SATURATION: 99 % | HEART RATE: 75 BPM | WEIGHT: 169 LBS | HEIGHT: 64 IN | BODY MASS INDEX: 28.85 KG/M2

## 2021-09-22 DIAGNOSIS — M25.512 BILATERAL SHOULDER PAIN, UNSPECIFIED CHRONICITY: Primary | ICD-10-CM

## 2021-09-22 DIAGNOSIS — M25.511 BILATERAL SHOULDER PAIN, UNSPECIFIED CHRONICITY: Primary | ICD-10-CM

## 2021-09-22 DIAGNOSIS — M75.41 IMPINGEMENT SYNDROME OF RIGHT SHOULDER: ICD-10-CM

## 2021-09-22 DIAGNOSIS — M75.42 IMPINGEMENT SYNDROME OF LEFT SHOULDER: ICD-10-CM

## 2021-09-22 PROCEDURE — 99203 OFFICE O/P NEW LOW 30 MIN: CPT | Performed by: STUDENT IN AN ORGANIZED HEALTH CARE EDUCATION/TRAINING PROGRAM

## 2021-09-22 NOTE — PROGRESS NOTES
"Chief Complaint  Pain of the Right Shoulder and Pain of the Left Shoulder    Subjective          Gloria Stewart presents to [facility] for   History of Present Illness    Gloria presents today for bilateral shoulder pain. Reports that the pain started back in November 2020. Denies injury or trauma. States the pain feels \"deep\". Dr. Priest saw her and stated he did not feel like it was nerve related. Reports she continues to still feel strong but when she performs certain types of reaching activities, the pain increases. Has tried creams and tylenol without significant releif. PCP gave her oral Voltaren.  She had a previous shoulder arthroscopy for frozen shoulder in [facility] in 2014. She has a history of Spinal stenosis.  She had a cervical fusion in 2010.      No Known Allergies     Social History     Socioeconomic History   • Marital status:      Spouse name: Not on file   • Number of children: Not on file   • Years of education: Not on file   • Highest education level: Not on file   Tobacco Use   • Smoking status: Never Smoker   • Smokeless tobacco: Never Used   Vaping Use   • Vaping Use: Never used   Substance and Sexual Activity   • Alcohol use: Not Currently   • Drug use: Never        I reviewed the patient's chief complaint, history of present illness, review of systems, past medical history, surgical history, family history, social history, medications, and allergy list.     REVIEW OF SYSTEMS    Constitutional: Denies fevers, chills, weight loss  Cardiovascular: Denies chest pain, shortness of breath  Skin: Denies rashes, acute skin changes  Neurologic: Denies headache, loss of consciousness  MSK: bilateral shoulder pain      Objective   Vital Signs:   Pulse 75   Ht 162.6 cm (64\")   Wt 76.7 kg (169 lb)   SpO2 99%   BMI 29.01 kg/m²     Body mass index is 29.01 kg/m².    Physical Exam  General: Alert, no acute distress  Bilateral shoulders. Well healed arthroscopy " portals about right shoulder. Active elevation 180 degrees bilaterally. External rotation to 30 degrees on right and 45 degrees on left. Internal rotation to mid lumbar spine bilaterally. 5/5 rotator cuff testing no pain bilaterally. Positive impingement test bilaterally. Equivocal obriens testing bilaterally. No pain with speeds testing. Palpable radial pulse bilaterally. Sensation intact in the axillary, median, radial, ulnar nerve distributions bilaterally.    Neck: limited cervical ROM.     Procedures    Imaging Results (Most Recent)     Procedure Component Value Units Date/Time    XR Shoulder 2+ View Bilateral [[id number]] Resulted: 09/22/21 1546     Updated: 09/22/21 1547    Narrative:      Indications: Bilateral shoulder pain    Views: AP, Grashey, axillary, scapular Y bilateral shoulders    Findings: Left shoulder: No fractures noted.  Glenohumeral joint well   aligned.  Mild arthritic changes at the AC joint.  No arthritic changes at   the glenohumeral joint.  Humeral height is appropriate.    Right shoulder: Mild AC joint arthritic changes.  No glenohumeral   arthritic changes.  No fractures.  Glenohumeral joint well aligned.    Humeral height appears appropriate.    Comparative Data: Comparative data found and reviewed today             Assessment and Plan    Diagnoses and all orders for this visit:    1. Bilateral shoulder pain, unspecified chronicity (Primary)  -     XR Shoulder 2+ View Bilateral    2. Impingement syndrome of left shoulder  -     Ambulatory Referral to Physical Therapy Evaluate and treat, Ortho; Stretching, ROM, Strengthening    3. Impingement syndrome of right shoulder  -     Ambulatory Referral to Physical Therapy Evaluate and treat, Ortho; Stretching, ROM, Strengthening      Gloria has Bilateral shoulder impingement. Discussed nonoperative management options. Discussed therapy and antiinflammatories. If no significant relief we will discuss injections in the future. Patient expressed  understanding and wished to proceed with formal therapy and Voltaren. Follow up in 4 weeks to reevaluate. No new xrays are needed next visit.       Scribed for Kareem Boogie MD by Brigida Weller MA.  09/22/21   14:54 EDT    Follow Up   Return in about 4 weeks (around 10/20/2021).  Patient was given instructions and counseling regarding her condition or for health maintenance advice. Please see specific information pulled into the AVS if appropriate.     Electronically signed by Kareem Boogie MD, 09/26/21, 9:26 AM EDT.

## 2021-10-04 RX ORDER — SEMAGLUTIDE 1.34 MG/ML
INJECTION, SOLUTION SUBCUTANEOUS
Qty: 4.5 ML | Refills: 0 | Status: SHIPPED | OUTPATIENT
Start: 2021-10-04 | End: 2021-12-29

## 2021-10-05 ENCOUNTER — TRANSCRIBE ORDERS (OUTPATIENT)
Dept: LAB | Facility: HOSPITAL | Age: 66
End: 2021-10-05

## 2021-10-05 ENCOUNTER — LAB (OUTPATIENT)
Dept: LAB | Facility: HOSPITAL | Age: 66
End: 2021-10-05

## 2021-10-05 DIAGNOSIS — Z79.890 NEED FOR PROPHYLACTIC HORMONE REPLACEMENT THERAPY (POSTMENOPAUSAL): ICD-10-CM

## 2021-10-05 DIAGNOSIS — N95.1 SYMPTOMATIC MENOPAUSAL OR FEMALE CLIMACTERIC STATES: ICD-10-CM

## 2021-10-05 DIAGNOSIS — N95.1 SYMPTOMATIC MENOPAUSAL OR FEMALE CLIMACTERIC STATES: Primary | ICD-10-CM

## 2021-10-05 LAB
ESTRADIOL SERPL HS-MCNC: 123 PG/ML
PROGEST SERPL-MCNC: 1.13 NG/ML

## 2021-10-05 PROCEDURE — 84403 ASSAY OF TOTAL TESTOSTERONE: CPT

## 2021-10-05 PROCEDURE — 84402 ASSAY OF FREE TESTOSTERONE: CPT

## 2021-10-05 PROCEDURE — 36415 COLL VENOUS BLD VENIPUNCTURE: CPT

## 2021-10-05 PROCEDURE — 84144 ASSAY OF PROGESTERONE: CPT

## 2021-10-05 PROCEDURE — 82679 ASSAY OF ESTRONE: CPT

## 2021-10-05 PROCEDURE — 82670 ASSAY OF TOTAL ESTRADIOL: CPT

## 2021-10-08 LAB — ESTRONE SERPL-MCNC: 177 PG/ML

## 2021-10-09 LAB
TESTOST FREE SERPL-MCNC: 3.9 PG/ML (ref 0–4.2)
TESTOST SERPL-MCNC: 52 NG/DL (ref 3–67)

## 2021-10-13 ENCOUNTER — TREATMENT (OUTPATIENT)
Dept: PHYSICAL THERAPY | Facility: CLINIC | Age: 66
End: 2021-10-13

## 2021-10-13 DIAGNOSIS — M75.42 IMPINGEMENT SYNDROME OF LEFT SHOULDER: Primary | ICD-10-CM

## 2021-10-13 DIAGNOSIS — M75.41 IMPINGEMENT SYNDROME OF RIGHT SHOULDER: ICD-10-CM

## 2021-10-13 PROCEDURE — 97140 MANUAL THERAPY 1/> REGIONS: CPT | Performed by: PHYSICAL THERAPIST

## 2021-10-13 PROCEDURE — 97110 THERAPEUTIC EXERCISES: CPT | Performed by: PHYSICAL THERAPIST

## 2021-10-13 PROCEDURE — 97162 PT EVAL MOD COMPLEX 30 MIN: CPT | Performed by: PHYSICAL THERAPIST

## 2021-10-13 NOTE — PROGRESS NOTES
Physical Therapy Initial Evaluation and Plan of Care    Patient: Gloria Stewart   : 1955  Diagnosis/ICD-10 Code:  Impingement syndrome of left shoulder [M75.42]  Referring practitioner: Kareem Boogie MD  Date of Initial Visit: 10/13/2021  Today's Date: 10/13/2021  Patient seen for 1 sessions           Subjective Questionnaire: QuickDASH: 20%      Subjective Evaluation    History of Present Illness  Mechanism of injury: Pt reporting having bilateral shoulder pain, feels like it runs into the neck, also into her collarbones. Pt with history of cervical fusion and right shoulder scope for a frozen shoulder. Worst at night trying to sleep on either shoulder. Reports she can complete all of her work or housework, but pays for it later. Has tried OTC pain meds and creams, but doesn't help. Also has a heating pad which helps for a bit.     Pain  Current pain ratin  At best pain rating: 3  At worst pain ratin  Quality: tight, dull ache and discomfort  Relieving factors: change in position and rest  Aggravating factors: sleeping, movement and lifting    Social Support  Lives with: spouse    Hand dominance: right    Diagnostic Tests  X-ray: abnormal    Patient Goals  Patient goals for therapy: decreased pain, increased motion, increased strength and independence with ADLs/IADLs           PMH: cervical fusion, right shoulder scope for frozen shoulder, diabetes, Cervical radiculopathy; Cervicalgia; HTN (hypertension); Lumbago        Objective          Palpation   Left   Muscle spasm in the upper trapezius.   Tenderness of the upper trapezius.   Trigger point to upper trapezius.     Right   Muscle spasm in the upper trapezius. Tenderness of the upper trapezius.   Trigger point to upper trapezius.     Active Range of Motion   Cervical/Thoracic Spine   Cervical    Flexion: 45 degrees   Extension: 30 degrees   Left lateral flexion: 30 degrees   Right lateral flexion: 22 degrees   Left rotation: 45 degrees   Right  rotation: 65 degrees   Left Shoulder   Flexion: 175 degrees   Abduction: 175 degrees   External rotation BTH: C7   Internal rotation BTB: T10     Right Shoulder   Flexion: 155 degrees   Abduction: 145 degrees   External rotation BTH: C7   Internal rotation BTB: T11     Strength/Myotome Testing     Left Shoulder     Planes of Motion   Flexion: 4+   Extension: 4+   Abduction: 4   External rotation at 0°: 4   Internal rotation at 0°: 4+   Horizontal abduction: 4     Right Shoulder     Planes of Motion   Flexion: 4+   Extension: 4+   Abduction: 4   External rotation at 0°: 4   Internal rotation at 0°: 4+   Horizontal abduction: 4     Tests     Left Shoulder   Negative Hawkin's and Neer's.     Right Shoulder   Positive Hawkin's and Neer's.       See Exercise, Manual, and Modality Logs for complete treatment.     Assessment & Plan     Assessment  Impairments: abnormal muscle firing, abnormal or restricted ROM, activity intolerance, impaired physical strength and pain with function  Assessment details: The patient presents to physical therapy with complaints of bilateral shoulder pain and muscle tightness likely due to postural deficits and contributions from previous cervical fusion. The patient presents with associated shoulder weakness, stiffness, and functional deficits (QuickDASH). The patient would benefit from skilled PT intervention to address the above mentioned functional limitations.     Prognosis: good  Functional Limitations: carrying objects, lifting, sleeping, reaching behind back and reaching overhead  Goals  Plan Goals: SHOULDER  PROBLEMS:     1. The patient has limited ROM of the right shoulder.    LTG 1: 12 weeks:  The patient will demonstrate 170 degrees of shoulder flexion, 170 degrees of shoulder abduction to allow the patient to reach into upper kitchen cabinets and manipulate clothing behind the back with greater ease.    STATUS:  New   STG 1a: 6 weeks:  The patient will demonstrate 155 degrees of   shoulder flexion, 155 degrees of shoulder abduction.    STATUS:  New   TREATMENT: Manual therapy, therapeutic exercise, home exercise instruction, and modalities as needed to include: electrical stimulation, ultrasound, moist heat, and ice.    2. The patient has limited strength of the bilatera shoulder.   LTG 2: 12 weeks:  The patient will demonstrate 5 /5 strength for shoulder flexion, abduction, external rotation, and internal rotation in order to demonstrate improved shoulder stability.    STATUS:  New   STG 2a: 6 weeks:  The patient will demonstrate 4+ /5 strength for shoulder flexion, abduction, external rotation, and internal rotation.    STATUS:  New   STG2b:  6 weeks:  The patient will be independent with home exercises.     STATUS:  New   TREATMENT: Manual therapy, therapeutic exercise, home exercise instruction, and modalities as needed to include: electrical stimulation, ultrasound, moist heat, and ice.     3. The patient complains of pain to the bilateral shoulder.   LTG 3: 12 weeks:  The patient will report a pain rating of 3 /10 or better at its worst in order to improve sleep quality and tolerance to performance of activities of daily living.    STATUS:  New   STG 3a: 6 weeks:  The patient will report a pain rating of 5 /10 or better at its worst.     STATUS:  New   TREATMENT: Manual therapy, therapeutic exercise, home exercise instruction, and modalities as needed to include: electrical stimulation, ultrasound, moist heat, and ice.    4. Carrying, Moving, and Handling Objects Functional Limitation     LTG 4: 12 weeks:  The patient will demonstrate 1-19% limitation by achieving a score of 12-19 on the QuickDASH.    STATUS:  New   TREATMENT:  Manual therapy, therapeutic exercise, home exercise instruction, and modalities as needed to include: moist heat, electrical stimulation, and ultrasound.       Plan  Therapy options: will be seen for skilled physical therapy services  Planned modality  interventions: TENS, cryotherapy, thermotherapy (hydrocollator packs) and dry needling  Planned therapy interventions: functional ROM exercises, home exercise program, joint mobilization, manual therapy, soft tissue mobilization, stretching and strengthening  Frequency: 3x week  Duration in weeks: 12  Treatment plan discussed with: patient        Visit Diagnoses:    ICD-10-CM ICD-9-CM   1. Impingement syndrome of left shoulder  M75.42 726.2   2. Impingement syndrome of right shoulder  M75.41 726.2       History # of Personal Factors and/or Comorbidities: MODERATE (1-2)  Examination of Body System(s): # of elements: MODERATE (3)  Clinical Presentation: STABLE   Clinical Decision Making: MODERATE      Timed:         Manual Therapy:    10     mins  21890;     Therapeutic Exercise:    15     mins  77736;     Neuromuscular Yvonne:    0    mins  20232;    Therapeutic Activity:     0     mins  42390;     Gait Trainin     mins  59007;     Ultrasound:     0     mins  17556;    Ionto                               0    mins   92953  Self Care                       0     mins   95772  Canalith Repos    0     mins 60973      Un-Timed:  Electrical Stimulation:    0     mins  49497 ( );  Dry Needling     0     mins self-pay  Traction     0     mins 45329  Low Eval     0     Mins  75941  Mod Eval     30     Mins  49658  High Eval                       0     Mins  17052  Re-Eval                           0    mins  74090    Timed Treatment:   25   mins   Total Treatment:     55   mins    PT SIGNATURE: Elvin Mccall PT         Initial Certification  Certification Period: 10/13/2021 thru 2022  I certify that the therapy services are furnished while this patient is under my care.  The services outlined above are required by this patient, and will be reviewed every 90 days.     PHYSICIAN: Kareem Boogie MD      DATE:     Please sign and return via fax to [phone]. Thank you, [facility].

## 2021-11-05 ENCOUNTER — TELEPHONE (OUTPATIENT)
Dept: NEUROSURGERY | Facility: CLINIC | Age: 66
End: 2021-11-05

## 2021-11-05 NOTE — TELEPHONE ENCOUNTER
Pt was returning call about scheduling a surgery, she says anytime is good to call her back and she is home all day.

## 2021-11-22 ENCOUNTER — TELEPHONE (OUTPATIENT)
Dept: INTERNAL MEDICINE | Facility: CLINIC | Age: 66
End: 2021-11-22

## 2021-12-20 ENCOUNTER — TELEPHONE (OUTPATIENT)
Dept: NEUROSURGERY | Facility: CLINIC | Age: 66
End: 2021-12-20

## 2021-12-20 DIAGNOSIS — M25.511 BILATERAL SHOULDER PAIN, UNSPECIFIED CHRONICITY: ICD-10-CM

## 2021-12-20 DIAGNOSIS — M25.512 BILATERAL SHOULDER PAIN, UNSPECIFIED CHRONICITY: ICD-10-CM

## 2021-12-20 NOTE — TELEPHONE ENCOUNTER
Caller: Gloria Stewart  Relationship: Self  Best call back number:[phone]    What was the call regarding: PATIENT CALLED TO CANCEL SURGERY AS SHE IS HAVING  HERNIA SURGERY. I CALLED OVER TO [facility] AND HE IS SENDING A MESSAGE REGARDING THIS.    PATIENT WOULD LIKE A CALL BACK TO KNOW HOW TO PROCEED WITH GETTING THE SURGERY WITH DR. LEMON RESCHEDULED AFTER HER HERNIA SURGERY. SHE WOULD LIKE TO KNOW IF ANOTHER APPOINTMENT WOULD HAVE TO BE MADE TO SEE DR. LEMON AFTER THE HERNIA SURGERY BEFORE THE SURGERY COULD BE RESCHEDULED.

## 2021-12-20 NOTE — TELEPHONE ENCOUNTER
Patient states that he does not smoke anymore    No need for any nicotine replacement therapy at this time  If patient requests it will place on nicotine patch Pt is needing a refill.

## 2021-12-21 RX ORDER — ONDANSETRON 4 MG/1
TABLET, ORALLY DISINTEGRATING ORAL
Qty: 15 TABLET | Refills: 3 | Status: SHIPPED | OUTPATIENT
Start: 2021-12-21

## 2021-12-21 NOTE — TELEPHONE ENCOUNTER
Spoke with patient. She would like to try and reschedule in the fall of next year. We scheduled her for a follow up in August 2022 to discuss rescheduling at that time.

## 2021-12-29 RX ORDER — SEMAGLUTIDE 1.34 MG/ML
INJECTION, SOLUTION SUBCUTANEOUS
Qty: 4.5 ML | Refills: 0 | Status: SHIPPED | OUTPATIENT
Start: 2021-12-29 | End: 2022-03-14

## 2022-01-05 ENCOUNTER — APPOINTMENT (OUTPATIENT)
Dept: LAB | Facility: HOSPITAL | Age: 67
End: 2022-01-05

## 2022-02-15 ENCOUNTER — TRANSCRIBE ORDERS (OUTPATIENT)
Dept: LAB | Facility: HOSPITAL | Age: 67
End: 2022-02-15

## 2022-02-15 ENCOUNTER — LAB (OUTPATIENT)
Dept: LAB | Facility: HOSPITAL | Age: 67
End: 2022-02-15

## 2022-02-15 DIAGNOSIS — R07.9 CHEST PAIN, UNSPECIFIED TYPE: ICD-10-CM

## 2022-02-15 DIAGNOSIS — R07.9 CHEST PAIN, UNSPECIFIED TYPE: Primary | ICD-10-CM

## 2022-02-15 LAB
ALBUMIN SERPL-MCNC: 4.4 G/DL (ref 3.5–5.2)
ALBUMIN/GLOB SERPL: 1.4 G/DL
ALP SERPL-CCNC: 46 U/L (ref 39–117)
ALT SERPL W P-5'-P-CCNC: 31 U/L (ref 1–33)
ANION GAP SERPL CALCULATED.3IONS-SCNC: 10.5 MMOL/L (ref 5–15)
AST SERPL-CCNC: 21 U/L (ref 1–32)
BILIRUB SERPL-MCNC: 0.4 MG/DL (ref 0–1.2)
BUN SERPL-MCNC: 12 MG/DL (ref 8–23)
BUN/CREAT SERPL: 14.1 (ref 7–25)
CALCIUM SPEC-SCNC: 9.5 MG/DL (ref 8.6–10.5)
CHLORIDE SERPL-SCNC: 105 MMOL/L (ref 98–107)
CHOLEST SERPL-MCNC: 131 MG/DL (ref 0–200)
CO2 SERPL-SCNC: 24.5 MMOL/L (ref 22–29)
CREAT SERPL-MCNC: 0.85 MG/DL (ref 0.57–1)
GFR SERPL CREATININE-BSD FRML MDRD: 67 ML/MIN/1.73
GLOBULIN UR ELPH-MCNC: 3.2 GM/DL
GLUCOSE SERPL-MCNC: 95 MG/DL (ref 65–99)
HDLC SERPL-MCNC: 32 MG/DL (ref 40–60)
LDLC SERPL CALC-MCNC: 78 MG/DL (ref 0–100)
LDLC/HDLC SERPL: 2.38 {RATIO}
POTASSIUM SERPL-SCNC: 4.6 MMOL/L (ref 3.5–5.2)
PROT SERPL-MCNC: 7.6 G/DL (ref 6–8.5)
SODIUM SERPL-SCNC: 140 MMOL/L (ref 136–145)
TRIGL SERPL-MCNC: 114 MG/DL (ref 0–150)
VLDLC SERPL-MCNC: 21 MG/DL (ref 5–40)

## 2022-02-15 PROCEDURE — 80061 LIPID PANEL: CPT

## 2022-02-15 PROCEDURE — 36415 COLL VENOUS BLD VENIPUNCTURE: CPT

## 2022-02-15 PROCEDURE — 80053 COMPREHEN METABOLIC PANEL: CPT

## 2022-03-08 ENCOUNTER — LAB (OUTPATIENT)
Dept: LAB | Facility: HOSPITAL | Age: 67
End: 2022-03-08

## 2022-03-08 DIAGNOSIS — E78.2 MIXED HYPERLIPIDEMIA: Chronic | ICD-10-CM

## 2022-03-08 DIAGNOSIS — E11.9 TYPE 2 DIABETES MELLITUS WITHOUT COMPLICATION, UNSPECIFIED WHETHER LONG TERM INSULIN USE: ICD-10-CM

## 2022-03-08 DIAGNOSIS — E55.9 VITAMIN D DEFICIENCY: ICD-10-CM

## 2022-03-08 DIAGNOSIS — I10 ESSENTIAL HYPERTENSION: Chronic | ICD-10-CM

## 2022-03-08 LAB
25(OH)D3 SERPL-MCNC: 76.8 NG/ML (ref 30–100)
ALBUMIN SERPL-MCNC: 4.4 G/DL (ref 3.5–5.2)
ALBUMIN/GLOB SERPL: 1.4 G/DL
ALP SERPL-CCNC: 42 U/L (ref 39–117)
ALT SERPL W P-5'-P-CCNC: 73 U/L (ref 1–33)
ANION GAP SERPL CALCULATED.3IONS-SCNC: 12 MMOL/L (ref 5–15)
AST SERPL-CCNC: 45 U/L (ref 1–32)
BASOPHILS # BLD AUTO: 0.01 10*3/MM3 (ref 0–0.2)
BASOPHILS NFR BLD AUTO: 0.2 % (ref 0–1.5)
BILIRUB SERPL-MCNC: 0.2 MG/DL (ref 0–1.2)
BUN SERPL-MCNC: 14 MG/DL (ref 8–23)
BUN/CREAT SERPL: 18.2 (ref 7–25)
CALCIUM SPEC-SCNC: 9.7 MG/DL (ref 8.6–10.5)
CHLORIDE SERPL-SCNC: 107 MMOL/L (ref 98–107)
CHOLEST SERPL-MCNC: 170 MG/DL (ref 0–200)
CO2 SERPL-SCNC: 23 MMOL/L (ref 22–29)
CREAT SERPL-MCNC: 0.77 MG/DL (ref 0.57–1)
DEPRECATED RDW RBC AUTO: 41.9 FL (ref 37–54)
EGFRCR SERPLBLD CKD-EPI 2021: 85.7 ML/MIN/1.73
EOSINOPHIL # BLD AUTO: 0.11 10*3/MM3 (ref 0–0.4)
EOSINOPHIL NFR BLD AUTO: 2.6 % (ref 0.3–6.2)
ERYTHROCYTE [DISTWIDTH] IN BLOOD BY AUTOMATED COUNT: 12 % (ref 12.3–15.4)
GLOBULIN UR ELPH-MCNC: 3.1 GM/DL
GLUCOSE SERPL-MCNC: 95 MG/DL (ref 65–99)
HBA1C MFR BLD: 5.5 % (ref 4.8–5.6)
HCT VFR BLD AUTO: 41.9 % (ref 34–46.6)
HDLC SERPL-MCNC: 38 MG/DL (ref 40–60)
HGB BLD-MCNC: 14.3 G/DL (ref 12–15.9)
IMM GRANULOCYTES # BLD AUTO: 0.01 10*3/MM3 (ref 0–0.05)
IMM GRANULOCYTES NFR BLD AUTO: 0.2 % (ref 0–0.5)
LDLC SERPL CALC-MCNC: 106 MG/DL (ref 0–100)
LDLC/HDLC SERPL: 2.72 {RATIO}
LYMPHOCYTES # BLD AUTO: 1.69 10*3/MM3 (ref 0.7–3.1)
LYMPHOCYTES NFR BLD AUTO: 40.6 % (ref 19.6–45.3)
MCH RBC QN AUTO: 32.6 PG (ref 26.6–33)
MCHC RBC AUTO-ENTMCNC: 34.1 G/DL (ref 31.5–35.7)
MCV RBC AUTO: 95.7 FL (ref 79–97)
MONOCYTES # BLD AUTO: 0.4 10*3/MM3 (ref 0.1–0.9)
MONOCYTES NFR BLD AUTO: 9.6 % (ref 5–12)
NEUTROPHILS NFR BLD AUTO: 1.94 10*3/MM3 (ref 1.7–7)
NEUTROPHILS NFR BLD AUTO: 46.8 % (ref 42.7–76)
NRBC BLD AUTO-RTO: 0 /100 WBC (ref 0–0.2)
PLATELET # BLD AUTO: 201 10*3/MM3 (ref 140–450)
PMV BLD AUTO: 10 FL (ref 6–12)
POTASSIUM SERPL-SCNC: 4.5 MMOL/L (ref 3.5–5.2)
PROT SERPL-MCNC: 7.5 G/DL (ref 6–8.5)
RBC # BLD AUTO: 4.38 10*6/MM3 (ref 3.77–5.28)
SODIUM SERPL-SCNC: 142 MMOL/L (ref 136–145)
T4 FREE SERPL-MCNC: 0.98 NG/DL (ref 0.93–1.7)
TRIGL SERPL-MCNC: 143 MG/DL (ref 0–150)
TSH SERPL DL<=0.05 MIU/L-ACNC: 2.92 UIU/ML (ref 0.27–4.2)
VLDLC SERPL-MCNC: 26 MG/DL (ref 5–40)
WBC NRBC COR # BLD: 4.16 10*3/MM3 (ref 3.4–10.8)

## 2022-03-08 PROCEDURE — 84439 ASSAY OF FREE THYROXINE: CPT

## 2022-03-08 PROCEDURE — 80061 LIPID PANEL: CPT

## 2022-03-08 PROCEDURE — 80053 COMPREHEN METABOLIC PANEL: CPT

## 2022-03-08 PROCEDURE — 85025 COMPLETE CBC W/AUTO DIFF WBC: CPT

## 2022-03-08 PROCEDURE — 36415 COLL VENOUS BLD VENIPUNCTURE: CPT

## 2022-03-08 PROCEDURE — 83036 HEMOGLOBIN GLYCOSYLATED A1C: CPT

## 2022-03-08 PROCEDURE — 82306 VITAMIN D 25 HYDROXY: CPT

## 2022-03-08 PROCEDURE — 84443 ASSAY THYROID STIM HORMONE: CPT

## 2022-03-09 RX ORDER — FLUOCINONIDE TOPICAL SOLUTION USP, 0.05% 0.5 MG/ML
SOLUTION TOPICAL
COMMUNITY
Start: 2022-01-17 | End: 2022-08-17

## 2022-03-13 NOTE — PROGRESS NOTES
Chief Complaint  Back Pain (Follow-up on labs, spinal stenosis, carpal tunnel, interstitial cystitis, arthritis/)  Subjective        History of Present Illness  Gloria Stewart is a 66 y.o. female who presents to [facility] for follow-up of diabetes, hypertension, and vitamin D deficiency. Recent labs were reviewed. The patient is not having any medication side effects. Denies chest pain, shortness of air, dizziness, or change in bowel habits. She follows with cardiology every 6 months.     Followed by Dr. Priest, Dr. Tay (refills pravastatin and metoprolol-for palpitations), sees gynecologist and uses compound hormones, and dermatologist.  Has had chronic kidney disease stage III and knows to avoid NSAIDs. Family history of early MIs with 3 brothers at age 46, 51, and 62.  History of abnormal LFTs/fatty liver per ultrasound.  Status post hysterectomy 1994, status post neck and back surgery, status post gastric wrap, status post cholecystectomy.  Status post melanoma and sees dermatology regularly. Carotid Doppler scan 8/2020 no stenosis.  Colonoscopy 2017 and normal.  Tetanus vaccine 2020.    Past Medical History:   Diagnosis Date   • Cervical radiculopathy 11/10/2020   • Cervicalgia    • Diabetes mellitus (HCC)    • HTN (hypertension)    • Lumbago         Past Surgical History:   Procedure Laterality Date   • CERVICAL FUSION     • CHOLECYSTECTOMY     • HYSTERECTOMY     • OTHER SURGICAL HISTORY      DISCECTOMY UNSPECIFIED SITE   • STOMACH SURGERY          No Known Allergies       Current Outpatient Medications:   •  fluocinonide (LIDEX) 0.05 % external solution, APPLY TO AREAS ON SCALP NIGHTLY AS NEEDED, Disp: , Rfl:   •  HYDROcodone-acetaminophen (Norco) 5-325 MG per tablet, Take 1 or 2 tablets every 6 hours as needed for severe pain., Disp: 20 tablet, Rfl: 0  •  ketoconazole (NIZORAL) 2 % shampoo, DO A DAILY UNTIL CLEAR AND THEN CONTINUE FOR 2-3 TIMES PER WEEK FOR  "MAINTENANCE. LEAVE ON FOR 20 MINS BEFORE RINSING, Disp: , Rfl:   •  lisinopril (PRINIVIL,ZESTRIL) 10 MG tablet, Take 5 mg by mouth Daily., Disp: , Rfl:   •  metoprolol succinate XL (TOPROL-XL) 50 MG 24 hr tablet, Take 2 tablets by mouth Every Evening., Disp: , Rfl:   •  ondansetron ODT (ZOFRAN-ODT) 4 MG disintegrating tablet, DISSOLVE 1 TABLET ON THE TONGUE EVERY 8 HOURS AS NEEDED FOR NAUSEA OR VOMITING, Disp: 15 tablet, Rfl: 3  •  pravastatin (PRAVACHOL) 20 MG tablet, Take 1 tablet by mouth Every Evening., Disp: , Rfl:   •  meloxicam (Mobic) 7.5 MG tablet, Take 1 tablet by mouth Daily., Disp: 90 tablet, Rfl: 0    Objective   /82 (BP Location: Right arm, Patient Position: Sitting, Cuff Size: Adult)   Pulse 88   Temp 98.1 °F (36.7 °C) (Temporal)   Resp 16   Ht 162.6 cm (64\")   Wt 71.7 kg (158 lb)   SpO2 97%   BMI 27.12 kg/m²    Estimated body mass index is 27.12 kg/m² as calculated from the following:    Height as of this encounter: 162.6 cm (64\").    Weight as of this encounter: 71.7 kg (158 lb).   Physical Exam  Vitals reviewed.   Constitutional:       General: She is not in acute distress.     Appearance: Normal appearance.   HENT:      Head: Normocephalic and atraumatic.   Eyes:      Conjunctiva/sclera: Conjunctivae normal.   Neck:      Comments: No thyroid enlargement  Cardiovascular:      Rate and Rhythm: Normal rate and regular rhythm.      Heart sounds: Normal heart sounds.   Pulmonary:      Effort: Pulmonary effort is normal.      Breath sounds: Normal breath sounds. No wheezing, rhonchi or rales.   Musculoskeletal:      Cervical back: Neck supple.      Right lower leg: No edema.      Left lower leg: No edema.   Lymphadenopathy:      Cervical: No cervical adenopathy.   Skin:     General: Skin is warm and dry.   Neurological:      General: No focal deficit present.      Mental Status: She is alert.   Psychiatric:         Mood and Affect: Mood normal.         Behavior: Behavior normal.         " Thought Content: Thought content normal.         Judgment: Judgment normal.        Result Review :   The following data was reviewed by: LEATHA To on 03/14/2022:  CMP    CMP 9/14/21 2/15/22 3/8/22   Glucose 82 95 95   BUN 13 12 14   Creatinine 0.90 0.85 0.77   eGFR Non African Am 63 67    Sodium 137 140 142   Potassium 4.4 4.6 4.5   Chloride 101 105 107   Calcium 9.5 9.5 9.7   Albumin 4.60 4.40 4.40   Total Bilirubin 0.4 0.4 0.2   Alkaline Phosphatase 35 (A) 46 42   AST (SGOT) 20 21 45 (A)   ALT (SGPT) 21 31 73 (A)   (A) Abnormal value            CBC w/diff    CBC w/Diff 9/14/21 3/8/22   WBC 4.23 4.16   RBC 4.28 4.38   Hemoglobin 14.0 14.3   Hematocrit 41.4 41.9   MCV 96.7 95.7   MCH 32.7 32.6   MCHC 33.8 34.1   RDW 11.9 (A) 12.0 (A)   Platelets 216 201   Neutrophil Rel % 51.3 46.8   Immature Granulocyte Rel % 0.2 0.2   Lymphocyte Rel % 36.2 40.6   Monocyte Rel % 9.7 9.6   Eosinophil Rel % 2.1 2.6   Basophil Rel % 0.5 0.2   (A) Abnormal value            Lipid Panel    Lipid Panel 9/14/21 2/15/22 3/8/22   Total Cholesterol 172 131 170   Triglycerides 141 114 143   HDL Cholesterol 42 32 (A) 38 (A)   VLDL Cholesterol 25 21 26   LDL Cholesterol  105 (A) 78 106 (A)   LDL/HDL Ratio 2.42 2.38 2.72   (A) Abnormal value            TSH    TSH 9/14/21 3/8/22   TSH 2.280 2.920           A1C Last 3 Results    HGBA1C Last 3 Results 3/8/22   Hemoglobin A1C 5.50         Vitamin D 25 Hydroxy (03/08/2022 08:13)  T4, Free (03/08/2022 08:13)                  Assessment and Plan    Diagnoses and all orders for this visit:    1. Type 2 diabetes mellitus without complication, unspecified whether long term insulin use (HCC) (Primary)  Assessment & Plan:  Diabetes has improved and remained stable.  Insurance will not cover Ozempic.    Stop Ozempic an discussed diet and exercise to control glucose.   Diabetes will be reassessed in 6 months.    Orders:  -     Hemoglobin A1c; Future  -     Microalbumin / Creatinine Urine Ratio -  Urine, Clean Catch; Future    2. Essential hypertension  Assessment & Plan:  Hypertension is Stable .  Continue current treatment regimen.  Blood pressure will be reassessed at the next regular appointment.    Orders:  -     Comprehensive Metabolic Panel; Future  -     CBC & Differential; Future  -     T4, Free; Future  -     TSH; Future    3. Vitamin D deficiency  Assessment & Plan:  Current vitamin D level is normal and patient to continue current treatment plan.  Monitor.    Orders:  -     Vitamin D 25 Hydroxy; Future    4. Mixed hyperlipidemia  Assessment & Plan:  Lipid abnormalities have been stable.   Pharmacotherapy as ordered.  Lipids will be reassessed now and again in 6 months.    Orders:  -     Lipid Panel; Future    5. Arthritis  Assessment & Plan:  Arthritis in hands and wrist. Also, shoulder pain bilateral.     Orders:  -     meloxicam (Mobic) 7.5 MG tablet; Take 1 tablet by mouth Daily.  Dispense: 90 tablet; Refill: 0    Follow Up     Patient was given instructions and counseling regarding her condition or for health maintenance advice. Please see specific information pulled into the AVS if appropriate.   Return in about 6 months (around 9/14/2022) for Medicare Wellness-Welcome to Medicare (medicare Nov 2021).    LEATHA To

## 2022-03-14 ENCOUNTER — OFFICE VISIT (OUTPATIENT)
Dept: INTERNAL MEDICINE | Facility: CLINIC | Age: 67
End: 2022-03-14

## 2022-03-14 VITALS
WEIGHT: 158 LBS | SYSTOLIC BLOOD PRESSURE: 125 MMHG | DIASTOLIC BLOOD PRESSURE: 82 MMHG | TEMPERATURE: 98.1 F | RESPIRATION RATE: 16 BRPM | BODY MASS INDEX: 26.98 KG/M2 | HEART RATE: 88 BPM | OXYGEN SATURATION: 97 % | HEIGHT: 64 IN

## 2022-03-14 DIAGNOSIS — E78.2 MIXED HYPERLIPIDEMIA: ICD-10-CM

## 2022-03-14 DIAGNOSIS — E55.9 VITAMIN D DEFICIENCY: ICD-10-CM

## 2022-03-14 DIAGNOSIS — I10 ESSENTIAL HYPERTENSION: ICD-10-CM

## 2022-03-14 DIAGNOSIS — E11.9 TYPE 2 DIABETES MELLITUS WITHOUT COMPLICATION, UNSPECIFIED WHETHER LONG TERM INSULIN USE: Primary | ICD-10-CM

## 2022-03-14 DIAGNOSIS — M19.90 ARTHRITIS: ICD-10-CM

## 2022-03-14 PROBLEM — K66.0 INTRA-ABDOMINAL ADHESIONS: Status: ACTIVE | Noted: 2019-08-30

## 2022-03-14 PROBLEM — M54.2 NECK PAIN: Status: ACTIVE | Noted: 2022-03-14

## 2022-03-14 PROBLEM — M54.50 LOW BACK PAIN: Status: ACTIVE | Noted: 2022-03-14

## 2022-03-14 PROCEDURE — 99214 OFFICE O/P EST MOD 30 MIN: CPT | Performed by: NURSE PRACTITIONER

## 2022-03-14 RX ORDER — MELOXICAM 7.5 MG/1
7.5 TABLET ORAL DAILY
Qty: 90 TABLET | Refills: 0 | Status: SHIPPED | OUTPATIENT
Start: 2022-03-14 | End: 2022-08-17

## 2022-03-14 NOTE — ASSESSMENT & PLAN NOTE
Hypertension is Stable .  Continue current treatment regimen.  Blood pressure will be reassessed at the next regular appointment.

## 2022-03-14 NOTE — ASSESSMENT & PLAN NOTE
Diabetes has improved and remained stable.  Insurance will not cover Ozempic.    Stop Ozempic an discussed diet and exercise to control glucose.   Diabetes will be reassessed in 6 months.

## 2022-03-14 NOTE — ASSESSMENT & PLAN NOTE
Lipid abnormalities have been stable.   Pharmacotherapy as ordered.  Lipids will be reassessed now and again in 6 months.

## 2022-06-01 ENCOUNTER — TRANSCRIBE ORDERS (OUTPATIENT)
Dept: ADMINISTRATIVE | Facility: HOSPITAL | Age: 67
End: 2022-06-01

## 2022-06-01 DIAGNOSIS — Z12.31 SCREENING MAMMOGRAM, ENCOUNTER FOR: Primary | ICD-10-CM

## 2022-06-09 ENCOUNTER — TELEPHONE (OUTPATIENT)
Dept: INTERNAL MEDICINE | Facility: CLINIC | Age: 67
End: 2022-06-09

## 2022-06-09 NOTE — TELEPHONE ENCOUNTER
Caller: [facility] [employer].     Relationship:     Best call back number:[phone]    What is the best time to reach you: ANYTIME    Who are you requesting to speak with (clinical staff, provider,  specific staff member): NURSE     What was the call regarding: CALLING REGARDING PRIOR AUTHORIZATION FOR MEDICATION - OZEMPIC, PLEASE WHEN CALLING BACK USE REFERENCE NUMBER [id number].     Do you require a callback: YES

## 2022-08-17 ENCOUNTER — OFFICE VISIT (OUTPATIENT)
Dept: INTERNAL MEDICINE | Facility: CLINIC | Age: 67
End: 2022-08-17

## 2022-08-17 VITALS
BODY MASS INDEX: 27.01 KG/M2 | HEART RATE: 90 BPM | HEIGHT: 64 IN | OXYGEN SATURATION: 98 % | DIASTOLIC BLOOD PRESSURE: 90 MMHG | TEMPERATURE: 97.3 F | SYSTOLIC BLOOD PRESSURE: 121 MMHG | WEIGHT: 158.2 LBS

## 2022-08-17 DIAGNOSIS — R31.9 URINARY TRACT INFECTION WITH HEMATURIA, SITE UNSPECIFIED: Primary | ICD-10-CM

## 2022-08-17 DIAGNOSIS — N30.10 INTERSTITIAL CYSTITIS: ICD-10-CM

## 2022-08-17 DIAGNOSIS — M48.02 SPINAL STENOSIS IN CERVICAL REGION: ICD-10-CM

## 2022-08-17 DIAGNOSIS — N39.0 URINARY TRACT INFECTION WITH HEMATURIA, SITE UNSPECIFIED: Primary | ICD-10-CM

## 2022-08-17 LAB
BILIRUB BLD-MCNC: NEGATIVE MG/DL
CLARITY, POC: CLEAR
COLOR UR: YELLOW
EXPIRATION DATE: ABNORMAL
GLUCOSE UR STRIP-MCNC: NEGATIVE MG/DL
KETONES UR QL: NEGATIVE
LEUKOCYTE EST, POC: ABNORMAL
Lab: ABNORMAL
NITRITE UR-MCNC: POSITIVE MG/ML
PH UR: 6 [PH] (ref 5–8)
PROT UR STRIP-MCNC: NEGATIVE MG/DL
RBC # UR STRIP: ABNORMAL /UL
SP GR UR: 1.02 (ref 1–1.03)
UROBILINOGEN UR QL: NORMAL

## 2022-08-17 PROCEDURE — 87186 SC STD MICRODIL/AGAR DIL: CPT | Performed by: NURSE PRACTITIONER

## 2022-08-17 PROCEDURE — 99214 OFFICE O/P EST MOD 30 MIN: CPT | Performed by: NURSE PRACTITIONER

## 2022-08-17 PROCEDURE — 87086 URINE CULTURE/COLONY COUNT: CPT | Performed by: NURSE PRACTITIONER

## 2022-08-17 PROCEDURE — 81003 URINALYSIS AUTO W/O SCOPE: CPT | Performed by: NURSE PRACTITIONER

## 2022-08-17 PROCEDURE — 87181 SC STD AGAR DILUTION PER AGT: CPT | Performed by: NURSE PRACTITIONER

## 2022-08-17 PROCEDURE — 87088 URINE BACTERIA CULTURE: CPT | Performed by: NURSE PRACTITIONER

## 2022-08-17 RX ORDER — HYDROCODONE BITARTRATE AND ACETAMINOPHEN 5; 325 MG/1; MG/1
1 TABLET ORAL EVERY 4 HOURS PRN
Qty: 18 TABLET | Refills: 0 | Status: SHIPPED | OUTPATIENT
Start: 2022-08-17 | End: 2022-09-14 | Stop reason: SDUPTHER

## 2022-08-17 RX ORDER — CEFDINIR 300 MG/1
300 CAPSULE ORAL 2 TIMES DAILY
Qty: 14 CAPSULE | Refills: 0 | Status: SHIPPED | OUTPATIENT
Start: 2022-08-17 | End: 2022-08-25

## 2022-08-17 NOTE — PROGRESS NOTES
Chief Complaint  referral (Patient states she has interstitial cystitis. She states she is in a lot of pain. She needs a referral to urology. Patient has a trip to [place] planned and if she cannot get into urology soon, she wanted to know if she could get a few pain pills to hold her over. )  Subjective        History of Present Illness  Gloria Stewart is a 66 y.o. female who presents to [facility] for:     1.  Complaint of dysuria that worsened last night. She has noticed an odor in urine past few days. Taking AZO past few days and that is not helping.    2.  Complaint of neck pain.  She will be going on a trip in the car with her  and it is a long drive and she is requesting pain medication.  She canceled her surgery with Dr. Priest because of having a hernia surgery and has not been able to get that rescheduled yet.  The surgery would mainly help to reduce her risk of spinal cord injury.      Past Medical History:   Diagnosis Date   • Cervical radiculopathy 11/10/2020   • Cervicalgia    • Diabetes mellitus (HCC)    • HTN (hypertension)    • Lumbago         Past Surgical History:   Procedure Laterality Date   • CERVICAL FUSION     • CHOLECYSTECTOMY     • HYSTERECTOMY     • OTHER SURGICAL HISTORY      DISCECTOMY UNSPECIFIED SITE   • STOMACH SURGERY          No Known Allergies       Current Outpatient Medications:   •  metoprolol succinate XL (TOPROL-XL) 50 MG 24 hr tablet, Take 2 tablets by mouth Every Evening., Disp: , Rfl:   •  ondansetron ODT (ZOFRAN-ODT) 4 MG disintegrating tablet, DISSOLVE 1 TABLET ON THE TONGUE EVERY 8 HOURS AS NEEDED FOR NAUSEA OR VOMITING, Disp: 15 tablet, Rfl: 3  •  pravastatin (PRAVACHOL) 20 MG tablet, Take 1 tablet by mouth Every Evening., Disp: , Rfl:   •  cefdinir (OMNICEF) 300 MG capsule, Take 1 capsule by mouth 2 (Two) Times a Day for 7 days., Disp: 14 capsule, Rfl: 0  •  HYDROcodone-acetaminophen (Norco) 5-325 MG per tablet, Take 1 tablet  "by mouth Every 4 (Four) Hours As Needed for Moderate Pain ., Disp: 18 tablet, Rfl: 0    Objective   /90 (BP Location: Right arm, Patient Position: Sitting, Cuff Size: Large Adult)   Pulse 90   Temp 97.3 °F (36.3 °C) (Temporal)   Ht 162.6 cm (64\")   Wt 71.8 kg (158 lb 3.2 oz)   SpO2 98%   BMI 27.15 kg/m²    Estimated body mass index is 27.15 kg/m² as calculated from the following:    Height as of this encounter: 162.6 cm (64\").    Weight as of this encounter: 71.8 kg (158 lb 3.2 oz).   Physical Exam  Vitals reviewed.   Constitutional:       General: She is not in acute distress.     Appearance: Normal appearance.   HENT:      Head: Normocephalic and atraumatic.   Pulmonary:      Effort: Pulmonary effort is normal.   Abdominal:      Palpations: Abdomen is soft. There is no mass.      Tenderness: There is abdominal tenderness in the right lower quadrant, suprapubic area and left lower quadrant. There is no right CVA tenderness or left CVA tenderness.   Neurological:      General: No focal deficit present.      Mental Status: She is alert.   Psychiatric:         Thought Content: Thought content normal.        Result Review :   The following data was reviewed by: LEATHA To on 08/17/2022:    Data reviewed: Consultant notes Office Visit with Wilson Priest MD (08/19/2021)            Assessment and Plan    Diagnoses and all orders for this visit:    1. Urinary tract infection with hematuria, site unspecified (Primary)  Comments:  Urinalysis in office positive for nitrites, leukocytes, and blood.  Start cefdinir 300 mg twice a day for 7 days.  Continue AZO as directed.  Sent for C & S.  Orders:  -     POC Urinalysis Dipstick, Automated  -     Urine Culture - Urine, Urine, Clean Catch; Future  -     Urine Culture - Urine, Urine, Clean Catch    2. Interstitial cystitis  -     Ambulatory Referral to Urology    3. Spinal stenosis in cervical region  Comments:  Discussed spinal surgery to help reduce her " risk of spinal cord injury.  Norco 5-325 mg 1 tablet every 4 hours as needed for increased pain.    Orders:  -     HYDROcodone-acetaminophen (Norco) 5-325 MG per tablet; Take 1 tablet by mouth Every 4 (Four) Hours As Needed for Moderate Pain .  Dispense: 18 tablet; Refill: 0    Other orders  -     cefdinir (OMNICEF) 300 MG capsule; Take 1 capsule by mouth 2 (Two) Times a Day for 7 days.  Dispense: 14 capsule; Refill: 0    Advised patient of addictive potential of controlled substance, not to drive, dispose of properly, and keep secured.  AR reviewed.  Education on diagnosis, medication, and treatment plan.  The patient agrees to this plan.    Follow Up     Patient was given instructions and counseling regarding her condition. Please see specific information pulled into the AVS if appropriate.   Return if symptoms worsen or fail to improve.    LEATHA To

## 2022-08-18 ENCOUNTER — TELEPHONE (OUTPATIENT)
Dept: INTERNAL MEDICINE | Facility: CLINIC | Age: 67
End: 2022-08-18

## 2022-08-18 NOTE — TELEPHONE ENCOUNTER
[facility]EN[facility] CALLED. THEY RECEIVED A REFERRAL ON PATIENT FOR INTERSTITIAL CYSTITIS. HOWEVER, JENNY SAID THEY ARE BOOKED OUT UNTIL OCT.  ASKING IF IT IS OK TO SWITCH THE REFERRAL AND PATIENT OVER TO DR. CABALLERO'S OFFICE AS HE IS ABLE (GENERALLY) TO GET PATIENTS IN MUCH QUICKER.      ABHINAV LET JENNY KNOW @ [phone]

## 2022-08-20 LAB — BACTERIA SPEC AEROBE CULT: ABNORMAL

## 2022-08-24 ENCOUNTER — APPOINTMENT (OUTPATIENT)
Dept: MAMMOGRAPHY | Facility: HOSPITAL | Age: 67
End: 2022-08-24

## 2022-08-25 ENCOUNTER — OFFICE VISIT (OUTPATIENT)
Dept: UROLOGY | Facility: CLINIC | Age: 67
End: 2022-08-25

## 2022-08-25 ENCOUNTER — PATIENT ROUNDING (BHMG ONLY) (OUTPATIENT)
Dept: UROLOGY | Facility: CLINIC | Age: 67
End: 2022-08-25

## 2022-08-25 VITALS
HEIGHT: 64 IN | BODY MASS INDEX: 26.98 KG/M2 | WEIGHT: 158 LBS | DIASTOLIC BLOOD PRESSURE: 84 MMHG | HEART RATE: 54 BPM | TEMPERATURE: 98.4 F | SYSTOLIC BLOOD PRESSURE: 153 MMHG

## 2022-08-25 DIAGNOSIS — Z87.42 PERSONAL HISTORY OF ENDOMETRIOSIS: ICD-10-CM

## 2022-08-25 DIAGNOSIS — N30.11 INTERSTITIAL CYSTITIS (CHRONIC) WITH HEMATURIA: Primary | ICD-10-CM

## 2022-08-25 DIAGNOSIS — N39.0 URINARY TRACT INFECTION WITHOUT HEMATURIA, SITE UNSPECIFIED: ICD-10-CM

## 2022-08-25 LAB
BILIRUB BLD-MCNC: NEGATIVE MG/DL
CLARITY, POC: CLEAR
COLOR UR: YELLOW
EXPIRATION DATE: ABNORMAL
GLUCOSE UR STRIP-MCNC: NEGATIVE MG/DL
KETONES UR QL: NEGATIVE
LEUKOCYTE EST, POC: NEGATIVE
Lab: ABNORMAL
NITRITE UR-MCNC: NEGATIVE MG/ML
PH UR: 7 [PH] (ref 5–8)
PROT UR STRIP-MCNC: NEGATIVE MG/DL
RBC # UR STRIP: ABNORMAL /UL
SP GR UR: 1.01 (ref 1–1.03)
UROBILINOGEN UR QL: NORMAL

## 2022-08-25 PROCEDURE — 99213 OFFICE O/P EST LOW 20 MIN: CPT | Performed by: NURSE PRACTITIONER

## 2022-08-25 PROCEDURE — 81003 URINALYSIS AUTO W/O SCOPE: CPT | Performed by: NURSE PRACTITIONER

## 2022-08-25 RX ORDER — CLOBETASOL PROPIONATE 0.5 MG/G
CREAM TOPICAL
COMMUNITY
Start: 2022-08-18

## 2022-08-25 RX ORDER — LISINOPRIL 10 MG/1
TABLET ORAL
COMMUNITY
Start: 2022-08-22 | End: 2022-08-25

## 2022-08-25 NOTE — PROGRESS NOTES
A [employer] message has been sent to the patient for PATIENT ROUNDING  With [facility]

## 2022-08-25 NOTE — PROGRESS NOTES
"Chief Complaint  interstitial cystitis     Subjective          Gloria Stewart 66 y.o. female presents to [facility] UROLOGY  Referral per LEATHA To for possible Interstitial cystitis. Patient urine culture 8- positive for E. Coli and treated with Omnicef x 7 days. Past medical history include diabetes, arthritis and has underwent hysterectomy, cholecystectomy, several lysis of abdominal adhesions, and Disectomy with surgical fusion. Bishop Fundification for reported esophagitis.  Had undergone multiple surgical procedures to remove abdominal adhesions believes may have been related to endometriosis. Diagnosed with interstitial cystitis per gyn around 2013. Describes having cystoscopy and treatment in bladder. She had not had any problem with uti until recently. Recent ecoli uti resolved and now feels better. Urgency has resolved. Good urine flow. She is sexually active with spouse and is taking bio-identical hormones compounded per [facility]. Previously seeing GYN Felicia.    Review of Systems   Constitutional: Negative for chills and fever.      Objective   Vital Signs:   /84   Pulse 54   Temp 98.4 °F (36.9 °C)   Ht 162.6 cm (64\")   Wt 71.7 kg (158 lb)   BMI 27.12 kg/m²      Past Surgical History:   Procedure Laterality Date   • CERVICAL FUSION     • CHOLECYSTECTOMY     • HYSTERECTOMY     • OTHER SURGICAL HISTORY      DISCECTOMY UNSPECIFIED SITE   • STOMACH SURGERY      Lysis of Abdominal Adhesions, Bishop Fundoplication    Physical Exam  Vitals and nursing note reviewed.   Constitutional:       General: She is not in acute distress.     Appearance: Normal appearance. She is well-developed. She is not ill-appearing.      Comments: Ambulates without difficulty   Cardiovascular:      Rate and Rhythm: Normal rate.   Pulmonary:      Effort: Pulmonary effort is normal.      Breath sounds: Normal air entry.   Musculoskeletal:         General: Normal range of motion. "   Skin:     General: Skin is warm and dry.   Neurological:      Mental Status: She is alert and oriented to person, place, and time.      Motor: Motor function is intact.   Psychiatric:         Mood and Affect: Mood normal.         Behavior: Behavior normal. Behavior is cooperative.         Thought Content: Thought content normal.         Judgment: Judgment normal.        Result Review :   The following data was reviewed by: LEATHA Kaye on 08/25/2022:  CMP    CMP 9/14/21 2/15/22 3/8/22   Glucose 82 95 95   BUN 13 12 14   Creatinine 0.90 0.85 0.77   eGFR Non African Am 63 67    Sodium 137 140 142   Potassium 4.4 4.6 4.5   Chloride 101 105 107   Calcium 9.5 9.5 9.7   Albumin 4.60 4.40 4.40   Total Bilirubin 0.4 0.4 0.2   Alkaline Phosphatase 35 (A) 46 42   AST (SGOT) 20 21 45 (A)   ALT (SGPT) 21 31 73 (A)   (A) Abnormal value            CBC    CBC 9/14/21 3/8/22   WBC 4.23 4.16   RBC 4.28 4.38   Hemoglobin 14.0 14.3   Hematocrit 41.4 41.9   MCV 96.7 95.7   MCH 32.7 32.6   MCHC 33.8 34.1   RDW 11.9 (A) 12.0 (A)   Platelets 216 201   (A) Abnormal value            Lipid Panel    Lipid Panel 9/14/21 2/15/22 3/8/22   Total Cholesterol 172 131 170   Triglycerides 141 114 143   HDL Cholesterol 42 32 (A) 38 (A)   VLDL Cholesterol 25 21 26   LDL Cholesterol  105 (A) 78 106 (A)   LDL/HDL Ratio 2.42 2.38 2.72   (A) Abnormal value            UA    Urinalysis 8/17/22 8/25/22   Ketones, UA Negative Negative   Leukocytes, UA Trace (A) Negative   (A) Abnormal value            Urine Culture    Urine Culture 8/17/22   Urine Culture >100,000 CFU/mL Escherichia coli (A)   (A) Abnormal value                 CT Abdomen Pelvis With Contrast (08/16/2019 16:19)  Urine Culture - Urine, Urine, Clean Catch (08/17/2022 16:56)       Assessment and Plan    Diagnoses and all orders for this visit:    1. Interstitial cystitis (chronic) with hematuria (Primary)  -     Cystoscopy; Future    2. Urinary tract infection without hematuria, site  unspecified  -     POC Urinalysis Dipstick, Automated    3. Personal history of endometriosis    schedule cystoscopy in office to evaluate bladder and verify if any signs of interstitial cystitis.  Advised probiotic daily.  ic diet encouraged. Discussed recent single episode of ecoli uti and now better since treated. Interstitial cystitis diagnosis 2013 and relayed professional opinion of unlikely sudden IC flare after resolved for 7 years. May have just been the isolated uti. If develops recurrent uti, may consider imaging. Previous CT abd/pelvis no upper tract abnormalities. Discussed probiotic daily for womens health and maintain vaginal ph to avoid infections.     Follow Up   Return for for cystoscopy.  Patient was given instructions and counseling regarding her condition or for health maintenance advice. Please see specific information pulled into the AVS if appropriate.     Latonya Rebolledo, APRN

## 2022-08-29 PROBLEM — Z87.42 PERSONAL HISTORY OF ENDOMETRIOSIS: Status: ACTIVE | Noted: 2022-08-29

## 2022-08-29 PROBLEM — N30.11 INTERSTITIAL CYSTITIS (CHRONIC) WITH HEMATURIA: Status: ACTIVE | Noted: 2022-08-29

## 2022-09-07 ENCOUNTER — HOSPITAL ENCOUNTER (OUTPATIENT)
Dept: MAMMOGRAPHY | Facility: HOSPITAL | Age: 67
Discharge: HOME | End: 2022-09-07
Admitting: NURSE PRACTITIONER

## 2022-09-07 DIAGNOSIS — Z12.31 SCREENING MAMMOGRAM, ENCOUNTER FOR: ICD-10-CM

## 2022-09-07 PROCEDURE — 77063 BREAST TOMOSYNTHESIS BI: CPT

## 2022-09-07 PROCEDURE — 77067 SCR MAMMO BI INCL CAD: CPT

## 2022-09-09 ENCOUNTER — PROCEDURE VISIT (OUTPATIENT)
Dept: UROLOGY | Facility: CLINIC | Age: 67
End: 2022-09-09

## 2022-09-09 DIAGNOSIS — N39.0 RECURRENT UTI: Primary | ICD-10-CM

## 2022-09-09 DIAGNOSIS — D18.00 HEMANGIOMA, UNSPECIFIED SITE: ICD-10-CM

## 2022-09-09 PROCEDURE — 52000 CYSTOURETHROSCOPY: CPT | Performed by: UROLOGY

## 2022-09-09 NOTE — PROGRESS NOTES
Cystoscopy    Date/Time: 9/9/2022 10:32 AM  Performed by: Destiney Jackson MD  Authorized by: Destiney Jackson MD   Preparation: Patient was prepped and draped in the usual sterile fashion.  Local anesthesia used: yes    Anesthesia:  Local anesthesia used: yes  Local Anesthetic: topical anesthetic  Anesthetic total: 12 mL    Sedation:  Patient sedated: no           Indication.  History of interstitial cystitis.    Recurrent UTI    Patient was placed in lithotomy position.  Thorough scrubbing of lower abdomen and external genitalia was performed with Hibiclens.  Flexible Olympus cystoscope was inserted into the urethra.  Urethra looks fine and bladder neck is normal.  Both ureteral orifices are normal.  Bladder was checked very carefully and there is no tumor present.  Patient has prominent blood vessels and hemangiomas in the urinary bladder.  I felt the urinary bladder and patient had no pain on distention of urinary bladder.  I went ahead and emptied the urinary bladder using 60 cc syringe and refill the bladder and I still cannot elicit any pain in the urinary bladder or hemorrhages.  I checked bladder with a bluelight also and did not see any bladder tumors.  After filling her up with fluid, we removed the cystoscope.  We made her cough and she did not leak any urine.  She tolerated her procedure very well.    I do not see any changes of interstitial cystitis but she does have a lot of small hemangiomas in the urinary bladder.  Patient is on IC diet and I hope she does not have any more symptoms of irritation in the urinary bladder.  Patient can see Latonya in 6 months time

## 2022-09-11 NOTE — PROGRESS NOTES
The ABCs of the Annual Wellness Visit  Initial Medicare Wellness Visit    Chief Complaint   Patient presents with   • Medicare Wellness-subsequent     Subjective   History of Present Illness:  Gloria Stewart is a 66 y.o. female who presents for:     1.  An Initial Medicare Wellness Visit. Dr. Chowdhury refills Toprol and pravastatin.    2.  Complaining worsening of neck pain.  This has been worse over the past year and last 2 weeks she has had increased frequency of headaches and pain. Pain is in the entire neck and radiates into both arms. She has cervical stenosis and Dr. Priest recommended surgery but she reports that she didn't want to have it during the Covid pandemic and has not follow-up in about 2 years.     3.  Follow-up hypertension, hyperlipidemia, diabetes and vitamin D deficiency. Negative for chest pain, palpitations and shortness of air. Recent labs reviewed.     The following portions of the patient's history were reviewed and   updated as appropriate:   She  has a past medical history of Cervical radiculopathy (11/10/2020), Cervicalgia, Diabetes mellitus (HCC), HTN (hypertension), and Lumbago.  She has Essential hypertension; Hyperlipidemia; Type 2 diabetes mellitus without complication (HCC); Vitamin D deficiency; and Arthritis on their pertinent problem list.  She  has a past surgical history that includes Cervical fusion; Cholecystectomy; Other surgical history; Hysterectomy; and Stomach surgery.  Her family history includes Heart disease in an other family member.  She  reports that she has never smoked. She has never used smokeless tobacco. She reports previous alcohol use. She reports that she does not use drugs.  Current Outpatient Medications   Medication Sig Dispense Refill   • aspirin 81 MG EC tablet Take 81 mg by mouth Daily.     • clobetasol (TEMOVATE) 0.05 % cream APPLY TOPICALLY TO THE SCALP TWICE DAILY AS NEEDED     • HYDROcodone-acetaminophen (Norco) 5-325 MG per tablet Take 1 tablet  by mouth Every 4 (Four) Hours As Needed for Moderate Pain. 18 tablet 0   • metoprolol succinate XL (TOPROL-XL) 50 MG 24 hr tablet Take 2 tablets by mouth Every Evening.     • ondansetron ODT (ZOFRAN-ODT) 4 MG disintegrating tablet DISSOLVE 1 TABLET ON THE TONGUE EVERY 8 HOURS AS NEEDED FOR NAUSEA OR VOMITING 15 tablet 3   • pravastatin (PRAVACHOL) 20 MG tablet Take 1 tablet by mouth Every Evening.       No current facility-administered medications for this visit.     Current Outpatient Medications on File Prior to Visit   Medication Sig   • aspirin 81 MG EC tablet Take 81 mg by mouth Daily.   • clobetasol (TEMOVATE) 0.05 % cream APPLY TOPICALLY TO THE SCALP TWICE DAILY AS NEEDED   • metoprolol succinate XL (TOPROL-XL) 50 MG 24 hr tablet Take 2 tablets by mouth Every Evening.   • ondansetron ODT (ZOFRAN-ODT) 4 MG disintegrating tablet DISSOLVE 1 TABLET ON THE TONGUE EVERY 8 HOURS AS NEEDED FOR NAUSEA OR VOMITING   • pravastatin (PRAVACHOL) 20 MG tablet Take 1 tablet by mouth Every Evening.   • [DISCONTINUED] HYDROcodone-acetaminophen (Norco) 5-325 MG per tablet Take 1 tablet by mouth Every 4 (Four) Hours As Needed for Moderate Pain .     No current facility-administered medications on file prior to visit.     She has No Known Allergies..     Compared to one year ago, the patient feels her physical   health is the same.    Compared to one year ago, the patient feels her mental   health is the same.    Recent Hospitalizations:  She was not admitted to the hospital during the last year.       Current Medical Providers:  Patient Care Team:  Shannon Starr APRN as PCP - General (Nurse Practitioner)    Outpatient Medications Prior to Visit   Medication Sig Dispense Refill   • aspirin 81 MG EC tablet Take 81 mg by mouth Daily.     • clobetasol (TEMOVATE) 0.05 % cream APPLY TOPICALLY TO THE SCALP TWICE DAILY AS NEEDED     • metoprolol succinate XL (TOPROL-XL) 50 MG 24 hr tablet Take 2 tablets by mouth Every Evening.     •  ondansetron ODT (ZOFRAN-ODT) 4 MG disintegrating tablet DISSOLVE 1 TABLET ON THE TONGUE EVERY 8 HOURS AS NEEDED FOR NAUSEA OR VOMITING 15 tablet 3   • pravastatin (PRAVACHOL) 20 MG tablet Take 1 tablet by mouth Every Evening.     • HYDROcodone-acetaminophen (Norco) 5-325 MG per tablet Take 1 tablet by mouth Every 4 (Four) Hours As Needed for Moderate Pain . 18 tablet 0     No facility-administered medications prior to visit.       Opioid medication/s are on active medication list.  and I have evaluated her active treatment plan and pain score trends (see table).  There were no vitals filed for this visit.  I have reviewed the chart for potential of high risk medication and harmful drug interactions in the elderly.            Aspirin is not on active medication list.  Aspirin use is indicated based on review of current medical condition/s. Pros and cons of this therapy have been discussed with this patient. Benefits of this medication outweigh potential harm.  Patient has been instructed to start taking this medication..    Patient Active Problem List   Diagnosis   • Spinal stenosis in cervical region   • Essential hypertension   • Hyperlipidemia   • Pulmonary embolism (HCC)   • S/P cervical spinal fusion   • Tachycardia, unspecified   • Type 2 diabetes mellitus without complication (HCC)   • Vitamin D deficiency   • Impingement syndrome of right shoulder   • Impingement syndrome of left shoulder   • Intra-abdominal adhesions   • Low back pain   • Neck pain   • Arthritis   • Urinary tract infection without hematuria   • Interstitial cystitis (chronic) with hematuria   • Personal history of endometriosis     Advance Care Planning  Advance Directive is not on file.  ACP discussion was held with the patient during this visit. Patient does not have an advance directive, declines further assistance.          Objective       Vitals:    09/14/22 1443   BP: 121/70   BP Location: Left arm   Patient Position: Sitting   Cuff  "Size: Large Adult   Pulse: 67   Temp: 96.4 °F (35.8 °C)   TempSrc: Temporal   SpO2: 98%   Weight: 72 kg (158 lb 12.8 oz)   Height: 162.6 cm (64\")     Estimated body mass index is 27.26 kg/m² as calculated from the following:    Height as of this encounter: 162.6 cm (64\").    Weight as of this encounter: 72 kg (158 lb 12.8 oz).    BMI is >= 25 and <30. (Overweight) The following options were offered after discussion;: nutrition counseling/recommendations      Does the patient have evidence of cognitive impairment? No    Physical Exam  Vitals reviewed.   Constitutional:       General: She is not in acute distress.     Appearance: Normal appearance.   HENT:      Head: Normocephalic and atraumatic.      Right Ear: Tympanic membrane and ear canal normal.      Left Ear: Tympanic membrane and ear canal normal.   Eyes:      Conjunctiva/sclera: Conjunctivae normal.   Cardiovascular:      Rate and Rhythm: Normal rate and regular rhythm.      Heart sounds: Normal heart sounds. No murmur heard.  Pulmonary:      Effort: Pulmonary effort is normal.      Breath sounds: Normal breath sounds. No wheezing, rhonchi or rales.   Musculoskeletal:      Right lower leg: No edema.      Left lower leg: No edema.   Lymphadenopathy:      Cervical: No cervical adenopathy.   Skin:     General: Skin is warm and dry.      Coloration: Skin is not jaundiced or pale.   Neurological:      General: No focal deficit present.      Mental Status: She is alert.   Psychiatric:         Thought Content: Thought content normal.       Lab Results   Component Value Date    TRIG 114 09/12/2022    HDL 50 09/12/2022     (H) 09/12/2022    VLDL 20 09/12/2022    HGBA1C 5.80 (H) 09/12/2022          HEALTH RISK ASSESSMENT    Smoking Status:  Social History     Tobacco Use   Smoking Status Never Smoker   Smokeless Tobacco Never Used     Alcohol Consumption:  Social History     Substance and Sexual Activity   Alcohol Use Not Currently     Fall Risk " Screen:    STEADI Fall Risk Assessment has not been completed.    Depression Screen:   No flowsheet data found.    Health Habits and Functional and Cognitive Screening:  Functional & Cognitive Status 9/14/2022   Do you have difficulty preparing food and eating? No   Do you have difficulty bathing yourself, getting dressed or grooming yourself? No   Do you have difficulty using the toilet? No   Do you have difficulty moving around from place to place? No   Do you have trouble with steps or getting out of a bed or a chair? No   Current Diet Well Balanced Diet   Dental Exam Up to date   Eye Exam Not up to date   Exercise (times per week) 2 times per week   Current Exercises Include Treadmill   Do you need help using the phone?  No   Are you deaf or do you have serious difficulty hearing?  No   Do you need help with transportation? No   Do you need help shopping? No   Do you need help preparing meals?  No   Do you need help with housework?  No   Do you need help with laundry? No   Do you need help taking your medications? No   Do you need help managing money? No   Do you ever drive or ride in a car without wearing a seat belt? No   Have you felt unusual stress, anger or loneliness in the last month? No   Who do you live with? Spouse   If you need help, do you have trouble finding someone available to you? No   Have you been bothered in the last four weeks by sexual problems? No   Do you have difficulty concentrating, remembering or making decisions? No       Age-appropriate Screening Schedule:  Refer to the list below for future screening recommendations based on patient's age, sex and/or medical conditions. Orders for these recommended tests are listed in the plan section. The patient has been provided with a written plan.    Health Maintenance   Topic Date Due   • DIABETIC FOOT EXAM  Never done   • DIABETIC EYE EXAM  Never done   • URINE MICROALBUMIN  11/03/2021   • ZOSTER VACCINE (1 of 2) 09/14/2022 (Originally  11/17/2006)   • DXA SCAN  09/01/2023 (Originally 1955)   • INFLUENZA VACCINE  10/01/2022   • HEMOGLOBIN A1C  03/12/2023   • LIPID PANEL  09/12/2023   • MAMMOGRAM  09/07/2024   • TDAP/TD VACCINES (2 - Td or Tdap) 08/11/2030            Assessment & Plan   CMS Preventative Services Quick Reference  Risk Factors Identified During Encounter  Immunizations Discussed/Encouraged (specific Immunizations; Influenza, Shingrix and COVID19  The above risks/problems have been discussed with the patient.  Follow up actions/plans if indicated are seen below in the Assessment/Plan Section.  Pertinent information has been shared with the patient in the After Visit Summary.    Diagnoses and all orders for this visit:    1. Encounter for annual wellness exam in Medicare patient (Primary)  -     Comprehensive Metabolic Panel; Future  -     CBC & Differential; Future  -     Lipid Panel; Future  -     T4, Free; Future  -     TSH; Future    2. Spinal stenosis in cervical region  Comments:  Pt aware surgery needed. Advised return to neuro. RF Norco 5-325mg 1 tab q 4 hrs PRN mod/sev pain. Caution addiction potential; AR reviewed.     Orders:  -     HYDROcodone-acetaminophen (Norco) 5-325 MG per tablet; Take 1 tablet by mouth Every 4 (Four) Hours As Needed for Moderate Pain.  Dispense: 18 tablet; Refill: 0    3. Essential hypertension  Comments:  Stable on Toprol-XL 50 mg daily and to continue. Renal function normal.    4. Hyperlipidemia, unspecified hyperlipidemia type  Comments:  LDL worsening at 134. LFTs stable. Taking pravastatin 20 mg daily. Discussed diet improvement.    5. Type 2 diabetes mellitus without complication, without long-term current use of insulin (HCC)  Comments:  HA1C 5.80. Stable on diet-controlled treatment plan.  Orders:  -     Hemoglobin A1c; Future  -     Microalbumin / Creatinine Urine Ratio - Urine, Clean Catch; Future    6. Vitamin D deficiency  Comments:  Level 86. Continue to monitor  yearly.  Orders:  -     Vitamin D 25 Hydroxy; Future        Follow Up:  Return in about 1 year (around 9/14/2023) for Medicare Wellness.     An After Visit Summary and PPPS were made available to the patient.

## 2022-09-12 ENCOUNTER — LAB (OUTPATIENT)
Dept: LAB | Facility: HOSPITAL | Age: 67
End: 2022-09-12

## 2022-09-12 DIAGNOSIS — I10 ESSENTIAL HYPERTENSION: ICD-10-CM

## 2022-09-12 DIAGNOSIS — E11.9 TYPE 2 DIABETES MELLITUS WITHOUT COMPLICATION, UNSPECIFIED WHETHER LONG TERM INSULIN USE: ICD-10-CM

## 2022-09-12 DIAGNOSIS — E55.9 VITAMIN D DEFICIENCY: ICD-10-CM

## 2022-09-12 DIAGNOSIS — E78.2 MIXED HYPERLIPIDEMIA: ICD-10-CM

## 2022-09-12 LAB
25(OH)D3 SERPL-MCNC: 86.4 NG/ML (ref 30–100)
ALBUMIN SERPL-MCNC: 4.6 G/DL (ref 3.5–5.2)
ALBUMIN/GLOB SERPL: 1.8 G/DL
ALP SERPL-CCNC: 35 U/L (ref 39–117)
ALT SERPL W P-5'-P-CCNC: 34 U/L (ref 1–33)
ANION GAP SERPL CALCULATED.3IONS-SCNC: 9 MMOL/L (ref 5–15)
AST SERPL-CCNC: 32 U/L (ref 1–32)
BASOPHILS # BLD AUTO: 0.02 10*3/MM3 (ref 0–0.2)
BASOPHILS NFR BLD AUTO: 0.5 % (ref 0–1.5)
BILIRUB SERPL-MCNC: 0.4 MG/DL (ref 0–1.2)
BUN SERPL-MCNC: 12 MG/DL (ref 8–23)
BUN/CREAT SERPL: 13.3 (ref 7–25)
CALCIUM SPEC-SCNC: 9.8 MG/DL (ref 8.6–10.5)
CHLORIDE SERPL-SCNC: 106 MMOL/L (ref 98–107)
CHOLEST SERPL-MCNC: 204 MG/DL (ref 0–200)
CO2 SERPL-SCNC: 25 MMOL/L (ref 22–29)
CREAT SERPL-MCNC: 0.9 MG/DL (ref 0.57–1)
DEPRECATED RDW RBC AUTO: 42.7 FL (ref 37–54)
EGFRCR SERPLBLD CKD-EPI 2021: 71.1 ML/MIN/1.73
EOSINOPHIL # BLD AUTO: 0.11 10*3/MM3 (ref 0–0.4)
EOSINOPHIL NFR BLD AUTO: 2.7 % (ref 0.3–6.2)
ERYTHROCYTE [DISTWIDTH] IN BLOOD BY AUTOMATED COUNT: 12.2 % (ref 12.3–15.4)
GLOBULIN UR ELPH-MCNC: 2.5 GM/DL
GLUCOSE SERPL-MCNC: 99 MG/DL (ref 65–99)
HBA1C MFR BLD: 5.8 % (ref 4.8–5.6)
HCT VFR BLD AUTO: 41.1 % (ref 34–46.6)
HDLC SERPL-MCNC: 50 MG/DL (ref 40–60)
HGB BLD-MCNC: 14 G/DL (ref 12–15.9)
IMM GRANULOCYTES # BLD AUTO: 0.01 10*3/MM3 (ref 0–0.05)
IMM GRANULOCYTES NFR BLD AUTO: 0.2 % (ref 0–0.5)
LDLC SERPL CALC-MCNC: 134 MG/DL (ref 0–100)
LDLC/HDLC SERPL: 2.62 {RATIO}
LYMPHOCYTES # BLD AUTO: 1.7 10*3/MM3 (ref 0.7–3.1)
LYMPHOCYTES NFR BLD AUTO: 42 % (ref 19.6–45.3)
MCH RBC QN AUTO: 32.9 PG (ref 26.6–33)
MCHC RBC AUTO-ENTMCNC: 34.1 G/DL (ref 31.5–35.7)
MCV RBC AUTO: 96.5 FL (ref 79–97)
MONOCYTES # BLD AUTO: 0.32 10*3/MM3 (ref 0.1–0.9)
MONOCYTES NFR BLD AUTO: 7.9 % (ref 5–12)
NEUTROPHILS NFR BLD AUTO: 1.89 10*3/MM3 (ref 1.7–7)
NEUTROPHILS NFR BLD AUTO: 46.7 % (ref 42.7–76)
NRBC BLD AUTO-RTO: 0.2 /100 WBC (ref 0–0.2)
PLATELET # BLD AUTO: 209 10*3/MM3 (ref 140–450)
PMV BLD AUTO: 10.4 FL (ref 6–12)
POTASSIUM SERPL-SCNC: 4.6 MMOL/L (ref 3.5–5.2)
PROT SERPL-MCNC: 7.1 G/DL (ref 6–8.5)
RBC # BLD AUTO: 4.26 10*6/MM3 (ref 3.77–5.28)
SODIUM SERPL-SCNC: 140 MMOL/L (ref 136–145)
T4 FREE SERPL-MCNC: 0.99 NG/DL (ref 0.93–1.7)
TRIGL SERPL-MCNC: 114 MG/DL (ref 0–150)
TSH SERPL DL<=0.05 MIU/L-ACNC: 4.19 UIU/ML (ref 0.27–4.2)
VLDLC SERPL-MCNC: 20 MG/DL (ref 5–40)
WBC NRBC COR # BLD: 4.05 10*3/MM3 (ref 3.4–10.8)

## 2022-09-12 PROCEDURE — 80061 LIPID PANEL: CPT

## 2022-09-12 PROCEDURE — 80053 COMPREHEN METABOLIC PANEL: CPT

## 2022-09-12 PROCEDURE — 83036 HEMOGLOBIN GLYCOSYLATED A1C: CPT

## 2022-09-12 PROCEDURE — 36415 COLL VENOUS BLD VENIPUNCTURE: CPT

## 2022-09-12 PROCEDURE — 82306 VITAMIN D 25 HYDROXY: CPT

## 2022-09-12 PROCEDURE — 84443 ASSAY THYROID STIM HORMONE: CPT

## 2022-09-12 PROCEDURE — 84439 ASSAY OF FREE THYROXINE: CPT

## 2022-09-12 PROCEDURE — 85025 COMPLETE CBC W/AUTO DIFF WBC: CPT

## 2022-09-14 ENCOUNTER — OFFICE VISIT (OUTPATIENT)
Dept: INTERNAL MEDICINE | Facility: CLINIC | Age: 67
End: 2022-09-14

## 2022-09-14 VITALS
BODY MASS INDEX: 27.11 KG/M2 | OXYGEN SATURATION: 98 % | SYSTOLIC BLOOD PRESSURE: 121 MMHG | HEIGHT: 64 IN | WEIGHT: 158.8 LBS | HEART RATE: 67 BPM | TEMPERATURE: 96.4 F | DIASTOLIC BLOOD PRESSURE: 70 MMHG

## 2022-09-14 DIAGNOSIS — M48.02 SPINAL STENOSIS IN CERVICAL REGION: ICD-10-CM

## 2022-09-14 DIAGNOSIS — E55.9 VITAMIN D DEFICIENCY: ICD-10-CM

## 2022-09-14 DIAGNOSIS — E78.5 HYPERLIPIDEMIA, UNSPECIFIED HYPERLIPIDEMIA TYPE: Chronic | ICD-10-CM

## 2022-09-14 DIAGNOSIS — E11.9 TYPE 2 DIABETES MELLITUS WITHOUT COMPLICATION, WITHOUT LONG-TERM CURRENT USE OF INSULIN: ICD-10-CM

## 2022-09-14 DIAGNOSIS — I10 ESSENTIAL HYPERTENSION: Chronic | ICD-10-CM

## 2022-09-14 DIAGNOSIS — Z00.00 ENCOUNTER FOR ANNUAL WELLNESS EXAM IN MEDICARE PATIENT: Primary | ICD-10-CM

## 2022-09-14 PROCEDURE — 99214 OFFICE O/P EST MOD 30 MIN: CPT | Performed by: NURSE PRACTITIONER

## 2022-09-14 PROCEDURE — G0438 PPPS, INITIAL VISIT: HCPCS | Performed by: NURSE PRACTITIONER

## 2022-09-14 PROCEDURE — 1159F MED LIST DOCD IN RCRD: CPT | Performed by: NURSE PRACTITIONER

## 2022-09-14 PROCEDURE — 1170F FXNL STATUS ASSESSED: CPT | Performed by: NURSE PRACTITIONER

## 2022-09-14 RX ORDER — ASPIRIN 81 MG/1
81 TABLET ORAL DAILY
COMMUNITY

## 2022-09-14 RX ORDER — HYDROCODONE BITARTRATE AND ACETAMINOPHEN 5; 325 MG/1; MG/1
1 TABLET ORAL EVERY 4 HOURS PRN
Qty: 18 TABLET | Refills: 0 | Status: SHIPPED | OUTPATIENT
Start: 2022-09-14

## 2022-09-22 ENCOUNTER — OFFICE VISIT (OUTPATIENT)
Dept: NEUROSURGERY | Facility: CLINIC | Age: 67
End: 2022-09-22

## 2022-09-22 VITALS
HEIGHT: 64 IN | DIASTOLIC BLOOD PRESSURE: 78 MMHG | BODY MASS INDEX: 27.26 KG/M2 | WEIGHT: 159.7 LBS | SYSTOLIC BLOOD PRESSURE: 138 MMHG

## 2022-09-22 DIAGNOSIS — G89.29 CHRONIC MIDLINE LOW BACK PAIN WITHOUT SCIATICA: ICD-10-CM

## 2022-09-22 DIAGNOSIS — M54.50 CHRONIC MIDLINE LOW BACK PAIN WITHOUT SCIATICA: ICD-10-CM

## 2022-09-22 DIAGNOSIS — M48.02 SPINAL STENOSIS IN CERVICAL REGION: Primary | ICD-10-CM

## 2022-09-22 PROCEDURE — 99213 OFFICE O/P EST LOW 20 MIN: CPT | Performed by: NEUROLOGICAL SURGERY

## 2022-09-22 NOTE — PROGRESS NOTES
Gloria Stewart is a 66 y.o. female that presents with Neck Pain       She has noticed increase pain over the last few weeks. She has neck and lower back pain. Any activity worsens the pain. She feels like there is a clamp on her neck and head. She has numbness and pain into the hands that worsens with just touching the skin hurts. She doesn't have significant leg pain, but the lower back pain shoots to the right.       Review of Systems   Musculoskeletal: Positive for arthralgias, back pain, myalgias and neck pain.        Vitals:    09/22/22 0824   BP: 138/78        Physical Exam  Constitutional:       Comments: BMI 27   Cardiovascular:      Comments: No edema    Pulmonary:      Effort: Pulmonary effort is normal.   Skin:     General: Skin is warm and dry.   Neurological:      Mental Status: She is alert.      Sensory: No sensory deficit.      Deep Tendon Reflexes: Reflexes normal (2/4, no clonus).   Psychiatric:         Mood and Affect: Mood normal.             Assessment and Plan {CC Problem List  Visit Diagnosis  ROS  Review (Popup)  Health Maintenance  Quality  BestPractice  Medications  SmartSets  SnapShot Encounters  Media :23}   Problem List Items Addressed This Visit        Musculoskeletal and Injuries    Low back pain       Neuro    Spinal stenosis in cervical region - Primary          Follow Up {Instructions Charge Capture  Follow-up Communications :23}   No follow-ups on file.

## 2022-09-23 ENCOUNTER — DOCUMENTATION (OUTPATIENT)
Dept: PHYSICAL THERAPY | Facility: CLINIC | Age: 67
End: 2022-09-23

## 2022-09-23 NOTE — PROGRESS NOTES
Discharge Summary  Discharge Summary from Physical Therapy Report      Dates  PT visit: 10/13/22  Number of Visits: 1 Eval only     Plan Goals: SHOULDER  PROBLEMS:     1. The patient has limited ROM of the right shoulder.                         LTG 1: 12 weeks:  The patient will demonstrate 170 degrees of shoulder flexion, 170 degrees of shoulder abduction to allow the patient to reach into upper kitchen cabinets and manipulate clothing behind the back with greater ease.                          STATUS:  New              STG 1a: 6 weeks:  The patient will demonstrate 155 degrees of  shoulder flexion, 155 degrees of shoulder abduction.                          STATUS:  New              TREATMENT: Manual therapy, therapeutic exercise, home exercise instruction, and modalities as needed to include: electrical stimulation, ultrasound, moist heat, and ice.    2. The patient has limited strength of the bilatera shoulder.              LTG 2: 12 weeks:  The patient will demonstrate 5 /5 strength for shoulder flexion, abduction, external rotation, and internal rotation in order to demonstrate improved shoulder stability.                          STATUS:  New              STG 2a: 6 weeks:  The patient will demonstrate 4+ /5 strength for shoulder flexion, abduction, external rotation, and internal rotation.                          STATUS:  New              STG2b:  6 weeks:  The patient will be independent with home exercises.                           STATUS:  New              TREATMENT: Manual therapy, therapeutic exercise, home exercise instruction, and modalities as needed to include: electrical stimulation, ultrasound, moist heat, and ice.                3. The patient complains of pain to the bilateral shoulder.              LTG 3: 12 weeks:  The patient will report a pain rating of 3 /10 or better at its worst in order to improve sleep quality and tolerance to performance of activities of daily living.                           STATUS:  New              STG 3a: 6 weeks:  The patient will report a pain rating of 5 /10 or better at its worst.                           STATUS:  New              TREATMENT: Manual therapy, therapeutic exercise, home exercise instruction, and modalities as needed to include: electrical stimulation, ultrasound, moist heat, and ice.    4. Carrying, Moving, and Handling Objects Functional Limitation                               LTG 4: 12 weeks:  The patient will demonstrate 1-19% limitation by achieving a score of 12-19 on the QuickDASH.                          STATUS:  New              TREATMENT:  Manual therapy, therapeutic exercise, home exercise instruction, and modalities as needed to include: moist heat, electrical stimulation, and ultrasound.    Goals: Not Met    Discharge Plan: Continue with current home exercise program as instructed    Comments Pt has not returned since evaluation. Continue HEP.             Elvin Mccall PT  Physical Therapist    Electronically signed 9/23/2022    [place] License: PT - [id number]

## 2022-10-19 ENCOUNTER — HOSPITAL ENCOUNTER (OUTPATIENT)
Dept: MRI IMAGING | Facility: HOSPITAL | Age: 67
Discharge: HOME | End: 2022-10-19

## 2022-10-19 DIAGNOSIS — M54.50 CHRONIC MIDLINE LOW BACK PAIN WITHOUT SCIATICA: ICD-10-CM

## 2022-10-19 DIAGNOSIS — G89.29 CHRONIC MIDLINE LOW BACK PAIN WITHOUT SCIATICA: ICD-10-CM

## 2022-10-19 DIAGNOSIS — M48.02 SPINAL STENOSIS IN CERVICAL REGION: ICD-10-CM

## 2022-10-19 PROCEDURE — 72141 MRI NECK SPINE W/O DYE: CPT

## 2022-10-19 PROCEDURE — 72148 MRI LUMBAR SPINE W/O DYE: CPT

## 2022-10-20 ENCOUNTER — TELEPHONE (OUTPATIENT)
Dept: NEUROSURGERY | Facility: CLINIC | Age: 67
End: 2022-10-20

## 2022-11-01 ENCOUNTER — OFFICE VISIT (OUTPATIENT)
Dept: NEUROSURGERY | Facility: CLINIC | Age: 67
End: 2022-11-01

## 2022-11-01 VITALS
DIASTOLIC BLOOD PRESSURE: 89 MMHG | HEIGHT: 64 IN | SYSTOLIC BLOOD PRESSURE: 147 MMHG | WEIGHT: 163.7 LBS | BODY MASS INDEX: 27.95 KG/M2

## 2022-11-01 DIAGNOSIS — M54.2 CERVICALGIA: Primary | ICD-10-CM

## 2022-11-01 DIAGNOSIS — G44.209 TENSION HEADACHE: ICD-10-CM

## 2022-11-01 DIAGNOSIS — M47.812 CERVICAL SPONDYLOSIS WITHOUT MYELOPATHY: ICD-10-CM

## 2022-11-01 PROCEDURE — 99214 OFFICE O/P EST MOD 30 MIN: CPT | Performed by: NEUROLOGICAL SURGERY

## 2022-11-01 RX ORDER — BUTALBITAL, ACETAMINOPHEN AND CAFFEINE 50; 325; 40 MG/1; MG/1; MG/1
1 TABLET ORAL EVERY 4 HOURS PRN
Qty: 20 TABLET | Refills: 0 | Status: SHIPPED | OUTPATIENT
Start: 2022-11-01

## 2022-11-01 NOTE — PROGRESS NOTES
Gloria Stewart is a 66 y.o. female that presents with Neck Pain       She has continued neck pain and headaches. It is challenging to sleep secondary to the pain.       Review of Systems   Musculoskeletal: Positive for myalgias and neck pain.   Neurological: Positive for headaches.        Vitals:    11/01/22 0948   BP: 147/89        Physical Exam  Constitutional:       Appearance: She is normal weight.   Cardiovascular:      Comments: No edema  Pulmonary:      Effort: Pulmonary effort is normal.   Neurological:      Mental Status: She is alert.   Psychiatric:         Mood and Affect: Mood normal.        I personally reviewed the patient's MRI scan which shows loss of lordosis centered at C4-5. There is mild stenosis.      Assessment and Plan {CC Problem List  Visit Diagnosis  ROS  Review (Popup)  MineWhat Maintenance  Quality  BestPractice  Medications  SmartSets  SnapShot Encounters  Media :23}   Problem List Items Addressed This Visit    None  Visit Diagnoses     Cervicalgia    -  Primary    Tension headache        Cervical spondylosis without myelopathy            The headache and neck pain aren't likely to improve significantly with surgery (C4-5 ACDF). I would work aggressively on cervical strengthening.     She will monitor for worsened pain into the arm (particularly C5 dist).     Follow Up {Instructions Charge Capture  Follow-up Communications :23}   Return if symptoms worsen or fail to improve.

## 2022-11-03 ENCOUNTER — LAB (OUTPATIENT)
Dept: LAB | Facility: HOSPITAL | Age: 67
End: 2022-11-03

## 2022-11-03 ENCOUNTER — TRANSCRIBE ORDERS (OUTPATIENT)
Dept: CARDIOLOGY | Facility: HOSPITAL | Age: 67
End: 2022-11-03

## 2022-11-03 DIAGNOSIS — R07.9 CHEST PAIN, UNSPECIFIED TYPE: Primary | ICD-10-CM

## 2022-11-03 DIAGNOSIS — R07.9 CHEST PAIN, UNSPECIFIED TYPE: ICD-10-CM

## 2022-11-03 LAB
ALBUMIN SERPL-MCNC: 4.6 G/DL (ref 3.5–5.2)
ALBUMIN/GLOB SERPL: 1.6 G/DL
ALP SERPL-CCNC: 41 U/L (ref 39–117)
ALT SERPL W P-5'-P-CCNC: 37 U/L (ref 1–33)
ANION GAP SERPL CALCULATED.3IONS-SCNC: 11 MMOL/L (ref 5–15)
AST SERPL-CCNC: 36 U/L (ref 1–32)
BILIRUB SERPL-MCNC: 0.5 MG/DL (ref 0–1.2)
BUN SERPL-MCNC: 11 MG/DL (ref 8–23)
BUN/CREAT SERPL: 12.2 (ref 7–25)
CALCIUM SPEC-SCNC: 9.5 MG/DL (ref 8.6–10.5)
CHLORIDE SERPL-SCNC: 106 MMOL/L (ref 98–107)
CHOLEST SERPL-MCNC: 207 MG/DL (ref 0–200)
CO2 SERPL-SCNC: 23 MMOL/L (ref 22–29)
CREAT SERPL-MCNC: 0.9 MG/DL (ref 0.57–1)
EGFRCR SERPLBLD CKD-EPI 2021: 71.1 ML/MIN/1.73
GLOBULIN UR ELPH-MCNC: 2.8 GM/DL
GLUCOSE SERPL-MCNC: 98 MG/DL (ref 65–99)
HDLC SERPL-MCNC: 53 MG/DL (ref 40–60)
LDLC SERPL CALC-MCNC: 129 MG/DL (ref 0–100)
LDLC/HDLC SERPL: 2.38 {RATIO}
POTASSIUM SERPL-SCNC: 4.6 MMOL/L (ref 3.5–5.2)
PROT SERPL-MCNC: 7.4 G/DL (ref 6–8.5)
SODIUM SERPL-SCNC: 140 MMOL/L (ref 136–145)
TRIGL SERPL-MCNC: 139 MG/DL (ref 0–150)
VLDLC SERPL-MCNC: 25 MG/DL (ref 5–40)

## 2022-11-03 PROCEDURE — 80061 LIPID PANEL: CPT

## 2022-11-03 PROCEDURE — 36415 COLL VENOUS BLD VENIPUNCTURE: CPT

## 2022-11-03 PROCEDURE — 80053 COMPREHEN METABOLIC PANEL: CPT

## 2022-12-12 ENCOUNTER — TELEPHONE (OUTPATIENT)
Dept: INTERNAL MEDICINE | Facility: CLINIC | Age: 67
End: 2022-12-12

## 2022-12-12 NOTE — TELEPHONE ENCOUNTER
Caller: Gloria Stewart    Relationship: Self    Best call back number: [phone]    What is the best time to reach you: ANY     Who are you requesting to speak with (clinical staff, provider,  specific staff member): CLINICAL       What was the call regarding: PATIENT IS WANTING TO KNOW IF SHAY WOULD BE ABLE TO TELL IF SHE HAS KIDNEY STONES FROM A URINE ANALYSIS. SHE HAS BEEN HAVING SYMPTOMS SIMILAR TO THE LAST TIME SHE HAD KIDNEY STONES SINCE 12.9.22. PLEASE ADVISE.     Do you require a callback: YES

## 2022-12-12 NOTE — TELEPHONE ENCOUNTER
Caller: Gloria Stewart    Relationship to patient: Self    Best call back number: [phone]    Patient is needing:PATIENT STATES SHE IS GOING TO SEE THE ORIGINAL UROLOGIST THAT SHE WAS SET UP WITH IN TH BEGINNING AT [facility] [place].

## 2022-12-13 ENCOUNTER — OFFICE VISIT (OUTPATIENT)
Dept: UROLOGY | Facility: CLINIC | Age: 67
End: 2022-12-13

## 2022-12-13 VITALS
HEIGHT: 64 IN | HEART RATE: 57 BPM | BODY MASS INDEX: 27.66 KG/M2 | SYSTOLIC BLOOD PRESSURE: 162 MMHG | DIASTOLIC BLOOD PRESSURE: 82 MMHG | WEIGHT: 162 LBS | TEMPERATURE: 98 F

## 2022-12-13 DIAGNOSIS — D18.09 HEMANGIOMA OF OTHER SITES: ICD-10-CM

## 2022-12-13 DIAGNOSIS — Z87.442 PERSONAL HISTORY OF RENAL CALCULI: ICD-10-CM

## 2022-12-13 DIAGNOSIS — M54.50 LEFT LOW BACK PAIN, UNSPECIFIED CHRONICITY, UNSPECIFIED WHETHER SCIATICA PRESENT: ICD-10-CM

## 2022-12-13 DIAGNOSIS — N30.11 INTERSTITIAL CYSTITIS (CHRONIC) WITH HEMATURIA: Primary | ICD-10-CM

## 2022-12-13 DIAGNOSIS — N39.0 RECURRENT UTI: ICD-10-CM

## 2022-12-13 DIAGNOSIS — R39.89 SENSATION OF PRESSURE IN BLADDER AREA: ICD-10-CM

## 2022-12-13 LAB
BILIRUB BLD-MCNC: NEGATIVE MG/DL
CLARITY, POC: CLEAR
COLOR UR: YELLOW
GLUCOSE UR STRIP-MCNC: NEGATIVE MG/DL
KETONES UR QL: NEGATIVE
LEUKOCYTE EST, POC: NEGATIVE
NITRITE UR-MCNC: NEGATIVE MG/ML
PH UR: 6 [PH] (ref 5–8)
PROT UR STRIP-MCNC: NEGATIVE MG/DL
RBC # UR STRIP: ABNORMAL /UL
SP GR UR: 1.01 (ref 1–1.03)
UROBILINOGEN UR QL: NORMAL

## 2022-12-13 PROCEDURE — 87086 URINE CULTURE/COLONY COUNT: CPT | Performed by: NURSE PRACTITIONER

## 2022-12-13 PROCEDURE — 51798 US URINE CAPACITY MEASURE: CPT | Performed by: NURSE PRACTITIONER

## 2022-12-13 PROCEDURE — 81002 URINALYSIS NONAUTO W/O SCOPE: CPT | Performed by: NURSE PRACTITIONER

## 2022-12-13 PROCEDURE — 99214 OFFICE O/P EST MOD 30 MIN: CPT | Performed by: NURSE PRACTITIONER

## 2022-12-13 RX ORDER — LOSARTAN POTASSIUM 25 MG/1
25 TABLET ORAL DAILY
COMMUNITY
Start: 2022-11-29

## 2022-12-13 NOTE — PROGRESS NOTES
"Chief Complaint  Flank Pain H/O IC  Hemangiomas in bladder per cystoscopy  Subjective          Gloria Stewart 67/y/o female presents to [facility]  History of Present Illness  Here for complaints of Flank pain. Patient had undergone cystoscopy per Dr Jackson 9/2022 and found to have a few hemangiomas in the bladder. No tumors and No petechial hemorrhages indicative of IC. History of chronic back pain. MRI of lumbar spine recent. Past historical CT 2015 without stones or hydronephrosis. Reports Friday  severe pain across her lower back and  Sharp pains left lower side \"left like a ball\" noting a region left sacral upper glute region.Concern since experience nausea and chills not normally felt with her back problems. Nausea and chills resolved. Pain not as severe.  States could not stand up straight following when occurred. Fear having Kidney stone again since experience similar symptoms when having stone 10+ years ago.  Plans for leaving on trip on Monday next week. Would be problematic if surgical intervention required. Denies fever.       Objective   Vital Signs:   /82   Pulse 57   Temp 98 °F (36.7 °C)   Ht 162.6 cm (64\")   Wt 73.5 kg (162 lb)   BMI 27.81 kg/m²     No Known Allergies   Past medical history:  has a past medical history of Cervical radiculopathy (11/10/2020), Cervicalgia, Diabetes mellitus (HCC), HTN (hypertension), and Lumbago.   Past surgical history:  has a past surgical history that includes Cervical fusion; Cholecystectomy; Other surgical history; Hysterectomy; and Stomach surgery.  Personal history: family history includes Heart disease in an other family member.  Social history:  reports that she has never smoked. She has never used smokeless tobacco. She reports that she does not currently use alcohol. She reports that she does not use drugs.    Review of Systems   Respiratory: Negative for shortness of breath.    Cardiovascular: Negative for chest pain. "   Gastrointestinal: Positive for abdominal distention and nausea.   Musculoskeletal: Positive for back pain.        Physical Exam  Vitals and nursing note reviewed.   Constitutional:       General: She is not in acute distress.     Appearance: Normal appearance. She is well-developed and well-groomed. She is not ill-appearing.      Comments: Ambulates without difficulty. Mild difficulty with bending or standing upright   Cardiovascular:      Rate and Rhythm: Normal rate.   Pulmonary:      Effort: Pulmonary effort is normal.      Breath sounds: Normal air entry. No wheezing, rhonchi or rales.   Abdominal:      Palpations: Abdomen is soft.      Tenderness: There is abdominal tenderness (entire low pelvic region below level umbilicus).   Musculoskeletal:         General: Normal range of motion.      Comments: Tender left lateral L-S region    Skin:     General: Skin is warm and dry.   Neurological:      Mental Status: She is alert and oriented to person, place, and time.      Motor: Motor function is intact.   Psychiatric:         Mood and Affect: Mood normal.         Behavior: Behavior normal. Behavior is cooperative.         Thought Content: Thought content normal.         Judgment: Judgment normal.        Result Review :   The following data was reviewed by: LEATHA Kaye on 12/13/2022:  CMP    CMP 3/8/22 9/12/22 11/3/22   Glucose 95 99 98   BUN 14 12 11   Creatinine 0.77 0.90 0.90   Sodium 142 140 140   Potassium 4.5 4.6 4.6   Chloride 107 106 106   Calcium 9.7 9.8 9.5   Albumin 4.40 4.60 4.60   Total Bilirubin 0.2 0.4 0.5   Alkaline Phosphatase 42 35 (A) 41   AST (SGOT) 45 (A) 32 36 (A)   ALT (SGPT) 73 (A) 34 (A) 37 (A)   (A) Abnormal value            CBC    CBC 3/8/22 9/12/22   WBC 4.16 4.05   RBC 4.38 4.26   Hemoglobin 14.3 14.0   Hematocrit 41.9 41.1   MCV 95.7 96.5   MCH 32.6 32.9   MCHC 34.1 34.1   RDW 12.0 (A) 12.2 (A)   Platelets 201 209   (A) Abnormal value            UA    Urinalysis 8/17/22  8/25/22 12/13/22   Ketones, UA Negative Negative Negative   Leukocytes, UA Trace (A) Negative Negative   (A) Abnormal value            Urine Culture    Urine Culture 8/17/22   Urine Culture >100,000 CFU/mL Escherichia coli (A)   (A) Abnormal value                      Assessment and Plan    Diagnoses and all orders for this visit:    1. Interstitial cystitis (chronic) with hematuria (Primary)  -     POC Urinalysis Dipstick  -     Urine Culture - Urine, Urine, Clean Catch    2. Hemangioma of other sites- cystoscopy 9/22 hemangiomas bladder    3. Sensation of pressure in bladder area  -     Urine Culture - Urine, Urine, Clean Catch    4. Recurrent UTI  -     Urine Culture - Urine, Urine, Clean Catch    5. Left low back pain, unspecified chronicity, unspecified whether sciatica present    6. Personal history of renal calculi      Results for orders placed or performed in visit on 12/13/22   POC Urinalysis Dipstick    Specimen: Urine   Result Value Ref Range    Color Yellow Yellow, Straw, Dark Yellow, Yadi    Clarity, UA Clear Clear    Glucose, UA Negative Negative mg/dL    Bilirubin Negative Negative    Ketones, UA Negative Negative    Specific Gravity  1.015 1.005 - 1.030    Blood, UA Trace (A) Negative    pH, Urine 6.0 5.0 - 8.0    Protein, POC Negative Negative mg/dL    Urobilinogen, UA Normal Normal, 0.2 E.U./dL    Leukocytes Negative Negative    Nitrite, UA Negative Negative     Will send urine for culture and instructed to double void. Bladder scan in office approximately 60cc. Will order CT abd/pelvis stone protocol to evaluate back and abdominal symptoms. Past history kidney stones. With surgical intervention. Patient having musculoskeletal type pain  however history of hematuria and stones and  along with complaint of nausea and feeling of chills.    Follow Up   No follow-ups on file.  Patient was given instructions and counseling regarding her condition or for health maintenance advice. Please see specific  information pulled into the AVS if appropriate.     Latonya Rebolledo, APRN

## 2022-12-14 NOTE — TELEPHONE ENCOUNTER
Called the patient and told her there would be no way to know if its kidney stones. If blood is in the urine that could potentially mean kidney stones. But no way to know for sure, unless you get CT.

## 2022-12-15 LAB — BACTERIA SPEC AEROBE CULT: NORMAL

## 2022-12-16 ENCOUNTER — TELEPHONE (OUTPATIENT)
Dept: UROLOGY | Facility: CLINIC | Age: 67
End: 2022-12-16

## 2022-12-16 DIAGNOSIS — D18.09 HEMANGIOMA OF OTHER SITES: ICD-10-CM

## 2022-12-16 DIAGNOSIS — N39.0 URINARY TRACT INFECTION WITHOUT HEMATURIA, SITE UNSPECIFIED: Primary | ICD-10-CM

## 2022-12-16 RX ORDER — CEPHALEXIN 500 MG/1
500 CAPSULE ORAL 2 TIMES DAILY
Qty: 10 CAPSULE | Refills: 0 | Status: SHIPPED | OUTPATIENT
Start: 2022-12-16 | End: 2022-12-21

## 2022-12-17 NOTE — TELEPHONE ENCOUNTER
I am sending in keflex to your pharmacy to take with you since leaving out of town on Monday. Urine culture showed mixed urogenital mary which is usually vaginal. I would normally not provide treatment with antibiotic without with rerun sensitivity. I would rather you have medication than not. Continue Taking probiotic to prevent yeast overgrowth. Thank you.

## 2023-01-11 ENCOUNTER — HOSPITAL ENCOUNTER (OUTPATIENT)
Dept: CT IMAGING | Facility: HOSPITAL | Age: 68
Discharge: HOME | End: 2023-01-11
Admitting: NURSE PRACTITIONER
Payer: MEDICARE

## 2023-01-11 DIAGNOSIS — M54.50 LEFT LOW BACK PAIN, UNSPECIFIED CHRONICITY, UNSPECIFIED WHETHER SCIATICA PRESENT: ICD-10-CM

## 2023-01-11 DIAGNOSIS — N39.0 RECURRENT UTI: ICD-10-CM

## 2023-01-11 DIAGNOSIS — D18.09 HEMANGIOMA OF OTHER SITES: ICD-10-CM

## 2023-01-11 DIAGNOSIS — Z87.442 PERSONAL HISTORY OF RENAL CALCULI: ICD-10-CM

## 2023-01-11 DIAGNOSIS — R39.89 SENSATION OF PRESSURE IN BLADDER AREA: ICD-10-CM

## 2023-01-11 PROCEDURE — 74176 CT ABD & PELVIS W/O CONTRAST: CPT

## 2023-01-16 ENCOUNTER — OFFICE VISIT (OUTPATIENT)
Dept: INTERNAL MEDICINE | Facility: CLINIC | Age: 68
End: 2023-01-16
Payer: MEDICARE

## 2023-01-16 VITALS
WEIGHT: 161.8 LBS | HEIGHT: 64 IN | SYSTOLIC BLOOD PRESSURE: 130 MMHG | DIASTOLIC BLOOD PRESSURE: 70 MMHG | HEART RATE: 71 BPM | TEMPERATURE: 97.2 F | BODY MASS INDEX: 27.62 KG/M2

## 2023-01-16 DIAGNOSIS — R91.1 PULMONARY NODULE LESS THAN 6 MM DETERMINED BY COMPUTED TOMOGRAPHY OF LUNG: ICD-10-CM

## 2023-01-16 DIAGNOSIS — E78.5 HYPERLIPIDEMIA, UNSPECIFIED HYPERLIPIDEMIA TYPE: ICD-10-CM

## 2023-01-16 DIAGNOSIS — M54.50 CHRONIC LEFT-SIDED LOW BACK PAIN, UNSPECIFIED WHETHER SCIATICA PRESENT: ICD-10-CM

## 2023-01-16 DIAGNOSIS — G89.29 CHRONIC LEFT-SIDED LOW BACK PAIN, UNSPECIFIED WHETHER SCIATICA PRESENT: ICD-10-CM

## 2023-01-16 DIAGNOSIS — Z71.2 ENCOUNTER TO DISCUSS TEST RESULTS: Primary | ICD-10-CM

## 2023-01-16 DIAGNOSIS — K76.0 FATTY INFILTRATION OF LIVER: ICD-10-CM

## 2023-01-16 PROBLEM — M51.36 DEGENERATIVE DISC DISEASE, LUMBAR: Status: ACTIVE | Noted: 2023-01-16

## 2023-01-16 PROBLEM — M51.369 DEGENERATIVE DISC DISEASE, LUMBAR: Status: ACTIVE | Noted: 2023-01-16

## 2023-01-16 PROCEDURE — 99213 OFFICE O/P EST LOW 20 MIN: CPT | Performed by: NURSE PRACTITIONER

## 2023-01-16 NOTE — PROGRESS NOTES
Chief Complaint  Abdominal Pain (Had a CT done on the 11th. She states she was concerned. She states she needs to know what the things on her scan showed. )  Subjective      History of Present Illness  Gloria Stewart is a 67 y.o. female with hypertension, hyperlipidemia, diabetes, cervical spinal stenosis, interstitial cystitis and vitamin D deficiency presents to [facility] to discuss CT of abdomin results. She had CT to check for kidney stones and ordered by urology.       Past Medical History:   Diagnosis Date   • Cervical radiculopathy 11/10/2020   • Cervicalgia    • Diabetes mellitus (HCC)    • HTN (hypertension)    • Lumbago         Past Surgical History:   Procedure Laterality Date   • CERVICAL FUSION     • CHOLECYSTECTOMY     • HYSTERECTOMY     • OTHER SURGICAL HISTORY      DISCECTOMY UNSPECIFIED SITE   • STOMACH SURGERY          No Known Allergies       Current Outpatient Medications:   •  aspirin 81 MG EC tablet, Take 81 mg by mouth Daily., Disp: , Rfl:   •  butalbital-acetaminophen-caffeine (Esgic) -40 MG per tablet, Take 1 tablet by mouth Every 4 (Four) Hours As Needed for Headache., Disp: 20 tablet, Rfl: 0  •  clobetasol (TEMOVATE) 0.05 % cream, APPLY TOPICALLY TO THE SCALP TWICE DAILY AS NEEDED, Disp: , Rfl:   •  HYDROcodone-acetaminophen (Norco) 5-325 MG per tablet, Take 1 tablet by mouth Every 4 (Four) Hours As Needed for Moderate Pain., Disp: 18 tablet, Rfl: 0  •  losartan (COZAAR) 25 MG tablet, Take 25 mg by mouth Daily., Disp: , Rfl:   •  metoprolol succinate XL (TOPROL-XL) 50 MG 24 hr tablet, Take 2 tablets by mouth Every Evening., Disp: , Rfl:   •  ondansetron ODT (ZOFRAN-ODT) 4 MG disintegrating tablet, DISSOLVE 1 TABLET ON THE TONGUE EVERY 8 HOURS AS NEEDED FOR NAUSEA OR VOMITING, Disp: 15 tablet, Rfl: 3  •  pravastatin (PRAVACHOL) 20 MG tablet, Take 1 tablet by mouth Every Evening., Disp: , Rfl:     Objective   /70 (BP Location: Left arm, Patient  "Position: Sitting, Cuff Size: Large Adult)   Pulse 71   Temp 97.2 °F (36.2 °C) (Temporal)   Ht 162.6 cm (64\")   Wt 73.4 kg (161 lb 12.8 oz)   BMI 27.77 kg/m²    Estimated body mass index is 27.77 kg/m² as calculated from the following:    Height as of this encounter: 162.6 cm (64\").    Weight as of this encounter: 73.4 kg (161 lb 12.8 oz).   Physical Exam  Vitals reviewed.   Constitutional:       General: She is not in acute distress.  HENT:      Head: Normocephalic and atraumatic.   Pulmonary:      Effort: Pulmonary effort is normal.   Neurological:      General: No focal deficit present.      Mental Status: She is alert.   Psychiatric:         Thought Content: Thought content normal.        Result Review :  The following data was reviewed by: LEATHA To on 01/16/2023:  UA    Urinalysis 8/17/22 8/25/22 12/13/22   Ketones, UA Negative Negative Negative   Leukocytes, UA Trace (A) Negative Negative   (A) Abnormal value            Urine Culture    Urine Culture 8/17/22 12/13/22   Urine Culture >100,000 CFU/mL Escherichia coli (A) 50,000 CFU/mL Normal Urogenital Nora   (A) Abnormal value            Data reviewed: Radiologic studies CT Abdomen Pelvis Stone Protocol (01/11/2023 13:02)       Narrative & Impression   PROCEDURE:  CT ABDOMEN PELVIS STONE PROTOCOL     COMPARISON: [facility], CT, ABDOMEN/PELVIS WITH CONTRAST, 8/16/2019, 15:56.     INDICATIONS:  PELVIC PAIN, LOW BACK PAIN, MICROSCOPIC HEMATURIA     PROTOCOL:     Standard imaging protocol performed                 RADIATION:      DLP: 507.1mGy*cm               Automated exposure control was utilized to minimize radiation dose.      TECHNIQUE: Axial images of the abdomen and pelvis without intravenous or oral contrast.      FINDINGS:     ABDOMEN:  Coronary artery calcification is present.  Stable 4 mm noncalcified right middle lobe nodule.    There is fatty infiltration of the liver.  The unenhanced spleen, pancreas and " adrenal glands are   normal.  No evidence of nephrolithiasis.  No evidence of hydronephrosis.  Prior cholecystectomy.     PELVIS:  Prior hysterectomy.  Prior appendectomy.  No evidence of bowel obstruction, perforation or abscess.    No CT evidence of colitis.  The abdominal aorta has a normal caliber.  Atherosclerotic disease is   present.  No ureteral or urinary bladder calcifications are identified.  There are pelvic   phleboliths.  Degenerative changes are present in the lumbar spine.  There is mild colonic   diverticulosis.     IMPRESSION:  No acute findings.  No evidence of urolithiasis or hydronephrosis.       MENDOZA LLAMAS MD                 Assessment and Plan   Diagnoses and all orders for this visit:    1. Encounter to discuss test results (Primary)    2. Chronic left-sided low back pain, unspecified whether sciatica present    3. Pulmonary nodule less than 6 mm determined by computed tomography of lung    4. Fatty infiltration of liver    5. Hyperlipidemia, unspecified hyperlipidemia type    Discussed CT results in detail including all incidental findings. I answered all of the patient's questions. She verbalized understanding.     Follow Up   Patient was given instructions and counseling regarding her condition. Please see specific information pulled into the AVS if appropriate.   Return for Next scheduled follow up.    LEATHA To

## 2023-05-01 ENCOUNTER — TRANSCRIBE ORDERS (OUTPATIENT)
Dept: ADMINISTRATIVE | Facility: HOSPITAL | Age: 68
End: 2023-05-01
Payer: MEDICARE

## 2023-05-01 DIAGNOSIS — Z78.0 MENOPAUSE: Primary | ICD-10-CM

## 2023-05-25 ENCOUNTER — HOSPITAL ENCOUNTER (OUTPATIENT)
Dept: BONE DENSITY | Facility: HOSPITAL | Age: 68
Discharge: HOME | End: 2023-05-25
Admitting: NURSE PRACTITIONER
Payer: MEDICARE

## 2023-05-25 DIAGNOSIS — Z78.0 MENOPAUSE: ICD-10-CM

## 2023-05-25 PROCEDURE — 77080 DXA BONE DENSITY AXIAL: CPT

## 2023-06-08 ENCOUNTER — TRANSCRIBE ORDERS (OUTPATIENT)
Dept: ADMINISTRATIVE | Facility: HOSPITAL | Age: 68
End: 2023-06-08
Payer: MEDICARE

## 2023-06-08 DIAGNOSIS — Z12.31 SCREENING MAMMOGRAM FOR BREAST CANCER: Primary | ICD-10-CM

## 2023-07-20 ENCOUNTER — LAB (OUTPATIENT)
Dept: LAB | Facility: HOSPITAL | Age: 68
End: 2023-07-20
Payer: MEDICARE

## 2023-07-20 ENCOUNTER — TRANSCRIBE ORDERS (OUTPATIENT)
Dept: LAB | Facility: HOSPITAL | Age: 68
End: 2023-07-20
Payer: MEDICARE

## 2023-07-20 DIAGNOSIS — R07.9 CHEST PAIN, UNSPECIFIED TYPE: Primary | ICD-10-CM

## 2023-07-20 DIAGNOSIS — R07.9 CHEST PAIN, UNSPECIFIED TYPE: ICD-10-CM

## 2023-07-20 LAB
ALBUMIN SERPL-MCNC: 4.6 G/DL (ref 3.5–5.2)
ALBUMIN/GLOB SERPL: 1.5 G/DL
ALP SERPL-CCNC: 43 U/L (ref 39–117)
ALT SERPL W P-5'-P-CCNC: 40 U/L (ref 1–33)
ANION GAP SERPL CALCULATED.3IONS-SCNC: 9.1 MMOL/L (ref 5–15)
AST SERPL-CCNC: 34 U/L (ref 1–32)
BILIRUB SERPL-MCNC: 0.7 MG/DL (ref 0–1.2)
BUN SERPL-MCNC: 16 MG/DL (ref 8–23)
BUN/CREAT SERPL: 16 (ref 7–25)
CALCIUM SPEC-SCNC: 10.1 MG/DL (ref 8.6–10.5)
CHLORIDE SERPL-SCNC: 105 MMOL/L (ref 98–107)
CHOLEST SERPL-MCNC: 222 MG/DL (ref 0–200)
CO2 SERPL-SCNC: 26.9 MMOL/L (ref 22–29)
CREAT SERPL-MCNC: 1 MG/DL (ref 0.57–1)
EGFRCR SERPLBLD CKD-EPI 2021: 62.3 ML/MIN/1.73
GLOBULIN UR ELPH-MCNC: 3 GM/DL
GLUCOSE SERPL-MCNC: 93 MG/DL (ref 65–99)
HDLC SERPL-MCNC: 57 MG/DL (ref 40–60)
LDLC SERPL CALC-MCNC: 149 MG/DL (ref 0–100)
LDLC/HDLC SERPL: 2.57 {RATIO}
POTASSIUM SERPL-SCNC: 5.4 MMOL/L (ref 3.5–5.2)
PROT SERPL-MCNC: 7.6 G/DL (ref 6–8.5)
SODIUM SERPL-SCNC: 141 MMOL/L (ref 136–145)
TRIGL SERPL-MCNC: 92 MG/DL (ref 0–150)
VLDLC SERPL-MCNC: 16 MG/DL (ref 5–40)

## 2023-07-20 PROCEDURE — 80053 COMPREHEN METABOLIC PANEL: CPT

## 2023-07-20 PROCEDURE — 36415 COLL VENOUS BLD VENIPUNCTURE: CPT

## 2023-07-20 PROCEDURE — 80061 LIPID PANEL: CPT

## 2023-09-11 ENCOUNTER — LAB (OUTPATIENT)
Dept: LAB | Facility: HOSPITAL | Age: 68
End: 2023-09-11
Payer: MEDICARE

## 2023-09-11 DIAGNOSIS — E11.9 TYPE 2 DIABETES MELLITUS WITHOUT COMPLICATION, WITHOUT LONG-TERM CURRENT USE OF INSULIN: ICD-10-CM

## 2023-09-11 DIAGNOSIS — E55.9 VITAMIN D DEFICIENCY: ICD-10-CM

## 2023-09-11 DIAGNOSIS — Z00.00 ENCOUNTER FOR ANNUAL WELLNESS EXAM IN MEDICARE PATIENT: ICD-10-CM

## 2023-09-11 LAB
25(OH)D3 SERPL-MCNC: 71.9 NG/ML (ref 30–100)
ALBUMIN SERPL-MCNC: 4.6 G/DL (ref 3.5–5.2)
ALBUMIN UR-MCNC: 3.1 MG/DL
ALBUMIN/GLOB SERPL: 1.6 G/DL
ALP SERPL-CCNC: 46 U/L (ref 39–117)
ALT SERPL W P-5'-P-CCNC: 35 U/L (ref 1–33)
ANION GAP SERPL CALCULATED.3IONS-SCNC: 8 MMOL/L (ref 5–15)
AST SERPL-CCNC: 35 U/L (ref 1–32)
BASOPHILS # BLD AUTO: 0.02 10*3/MM3 (ref 0–0.2)
BASOPHILS NFR BLD AUTO: 0.4 % (ref 0–1.5)
BILIRUB SERPL-MCNC: 0.5 MG/DL (ref 0–1.2)
BUN SERPL-MCNC: 14 MG/DL (ref 8–23)
BUN/CREAT SERPL: 15.2 (ref 7–25)
CALCIUM SPEC-SCNC: 9.9 MG/DL (ref 8.6–10.5)
CHLORIDE SERPL-SCNC: 107 MMOL/L (ref 98–107)
CHOLEST SERPL-MCNC: 186 MG/DL (ref 0–200)
CO2 SERPL-SCNC: 26 MMOL/L (ref 22–29)
CREAT SERPL-MCNC: 0.92 MG/DL (ref 0.57–1)
CREAT UR-MCNC: 210.2 MG/DL
DEPRECATED RDW RBC AUTO: 42.7 FL (ref 37–54)
EGFRCR SERPLBLD CKD-EPI 2021: 68.8 ML/MIN/1.73
EOSINOPHIL # BLD AUTO: 0.16 10*3/MM3 (ref 0–0.4)
EOSINOPHIL NFR BLD AUTO: 3.2 % (ref 0.3–6.2)
ERYTHROCYTE [DISTWIDTH] IN BLOOD BY AUTOMATED COUNT: 11.8 % (ref 12.3–15.4)
GLOBULIN UR ELPH-MCNC: 2.8 GM/DL
GLUCOSE SERPL-MCNC: 105 MG/DL (ref 65–99)
HBA1C MFR BLD: 5.3 % (ref 4.8–5.6)
HCT VFR BLD AUTO: 40.9 % (ref 34–46.6)
HDLC SERPL-MCNC: 58 MG/DL (ref 40–60)
HGB BLD-MCNC: 14.2 G/DL (ref 12–15.9)
IMM GRANULOCYTES # BLD AUTO: 0.01 10*3/MM3 (ref 0–0.05)
IMM GRANULOCYTES NFR BLD AUTO: 0.2 % (ref 0–0.5)
LDLC SERPL CALC-MCNC: 110 MG/DL (ref 0–100)
LDLC/HDLC SERPL: 1.87 {RATIO}
LYMPHOCYTES # BLD AUTO: 1.02 10*3/MM3 (ref 0.7–3.1)
LYMPHOCYTES NFR BLD AUTO: 20.5 % (ref 19.6–45.3)
MCH RBC QN AUTO: 34.2 PG (ref 26.6–33)
MCHC RBC AUTO-ENTMCNC: 34.7 G/DL (ref 31.5–35.7)
MCV RBC AUTO: 98.6 FL (ref 79–97)
MICROALBUMIN/CREAT UR: 14.7 MG/G
MONOCYTES # BLD AUTO: 0.37 10*3/MM3 (ref 0.1–0.9)
MONOCYTES NFR BLD AUTO: 7.4 % (ref 5–12)
NEUTROPHILS NFR BLD AUTO: 3.4 10*3/MM3 (ref 1.7–7)
NEUTROPHILS NFR BLD AUTO: 68.3 % (ref 42.7–76)
NRBC BLD AUTO-RTO: 0 /100 WBC (ref 0–0.2)
PLATELET # BLD AUTO: 221 10*3/MM3 (ref 140–450)
PMV BLD AUTO: 10.3 FL (ref 6–12)
POTASSIUM SERPL-SCNC: 4.7 MMOL/L (ref 3.5–5.2)
PROT SERPL-MCNC: 7.4 G/DL (ref 6–8.5)
RBC # BLD AUTO: 4.15 10*6/MM3 (ref 3.77–5.28)
SODIUM SERPL-SCNC: 141 MMOL/L (ref 136–145)
T4 FREE SERPL-MCNC: 1.14 NG/DL (ref 0.93–1.7)
TRIGL SERPL-MCNC: 97 MG/DL (ref 0–150)
TSH SERPL DL<=0.05 MIU/L-ACNC: 3.08 UIU/ML (ref 0.27–4.2)
VLDLC SERPL-MCNC: 18 MG/DL (ref 5–40)
WBC NRBC COR # BLD: 4.98 10*3/MM3 (ref 3.4–10.8)

## 2023-09-11 PROCEDURE — 85025 COMPLETE CBC W/AUTO DIFF WBC: CPT

## 2023-09-11 PROCEDURE — 84443 ASSAY THYROID STIM HORMONE: CPT

## 2023-09-11 PROCEDURE — 80053 COMPREHEN METABOLIC PANEL: CPT

## 2023-09-11 PROCEDURE — 80061 LIPID PANEL: CPT

## 2023-09-11 PROCEDURE — 82570 ASSAY OF URINE CREATININE: CPT

## 2023-09-11 PROCEDURE — 83036 HEMOGLOBIN GLYCOSYLATED A1C: CPT

## 2023-09-11 PROCEDURE — 36415 COLL VENOUS BLD VENIPUNCTURE: CPT

## 2023-09-11 PROCEDURE — 82043 UR ALBUMIN QUANTITATIVE: CPT

## 2023-09-11 PROCEDURE — 84439 ASSAY OF FREE THYROXINE: CPT

## 2023-09-11 PROCEDURE — 82306 VITAMIN D 25 HYDROXY: CPT

## 2023-09-11 NOTE — PROGRESS NOTES
The ABCs of the Annual Wellness Visit  Subsequent Medicare Wellness Visit    Subjective    Gloria Stewart is a 67 y.o. female who presents for a Subsequent Medicare Wellness Visit.    The following portions of the patient's history were reviewed and   updated as appropriate: She  has a past medical history of Cervical radiculopathy (11/10/2020), Cervicalgia, Diabetes mellitus, HTN (hypertension), and Lumbago.  She has Essential hypertension; Hyperlipidemia; Type 2 diabetes mellitus without complication (HCC); Vitamin D deficiency; and Arthritis on their pertinent problem list.  She  has a past surgical history that includes Cervical fusion; Cholecystectomy; Other surgical history; Hysterectomy; and Stomach surgery.  Her family history includes Heart disease in an other family member.  She  reports that she has never smoked. She has never used smokeless tobacco. She reports that she does not currently use alcohol. She reports that she does not use drugs.  Current Outpatient Medications   Medication Sig Dispense Refill    aspirin 81 MG EC tablet Take 1 tablet by mouth Daily.      butalbital-acetaminophen-caffeine (Esgic) -40 MG per tablet Take 1 tablet by mouth Every 4 (Four) Hours As Needed for Headache. 20 tablet 0    clobetasol (TEMOVATE) 0.05 % cream APPLY TOPICALLY TO THE SCALP TWICE DAILY AS NEEDED      HYDROcodone-acetaminophen (Norco) 5-325 MG per tablet Take 1 tablet by mouth Every 4 (Four) Hours As Needed for Moderate Pain. 18 tablet 0    losartan (COZAAR) 25 MG tablet Take 1 tablet by mouth Daily.      metoprolol succinate XL (TOPROL-XL) 50 MG 24 hr tablet Take 2 tablets by mouth Every Evening.      ondansetron ODT (ZOFRAN-ODT) 4 MG disintegrating tablet DISSOLVE 1 TABLET ON THE TONGUE EVERY 8 HOURS AS NEEDED FOR NAUSEA OR VOMITING 15 tablet 3    phenazopyridine (Pyridium) 200 MG tablet Take 1 tablet by mouth 3 (Three) Times a Day As Needed for Bladder Spasms. 6 tablet 0    pravastatin (PRAVACHOL) 40  MG tablet Take 1 tablet by mouth Daily.       No current facility-administered medications for this visit.     Current Outpatient Medications on File Prior to Visit   Medication Sig    aspirin 81 MG EC tablet Take 1 tablet by mouth Daily.    butalbital-acetaminophen-caffeine (Esgic) -40 MG per tablet Take 1 tablet by mouth Every 4 (Four) Hours As Needed for Headache.    clobetasol (TEMOVATE) 0.05 % cream APPLY TOPICALLY TO THE SCALP TWICE DAILY AS NEEDED    HYDROcodone-acetaminophen (Norco) 5-325 MG per tablet Take 1 tablet by mouth Every 4 (Four) Hours As Needed for Moderate Pain.    losartan (COZAAR) 25 MG tablet Take 1 tablet by mouth Daily.    metoprolol succinate XL (TOPROL-XL) 50 MG 24 hr tablet Take 2 tablets by mouth Every Evening.    ondansetron ODT (ZOFRAN-ODT) 4 MG disintegrating tablet DISSOLVE 1 TABLET ON THE TONGUE EVERY 8 HOURS AS NEEDED FOR NAUSEA OR VOMITING    phenazopyridine (Pyridium) 200 MG tablet Take 1 tablet by mouth 3 (Three) Times a Day As Needed for Bladder Spasms.    pravastatin (PRAVACHOL) 40 MG tablet Take 1 tablet by mouth Daily.     No current facility-administered medications on file prior to visit.     She has No Known Allergies..    Compared to one year ago, the patient feels her physical   health is the same.    Compared to one year ago, the patient feels her mental   health is the same.    Recent Hospitalizations:  She was not admitted to the hospital during the last year.       Current Medical Providers:  Patient Care Team:  Shannon Starr APRN as PCP - General (Nurse Practitioner)    Outpatient Medications Prior to Visit   Medication Sig Dispense Refill    aspirin 81 MG EC tablet Take 1 tablet by mouth Daily.      butalbital-acetaminophen-caffeine (Esgic) -40 MG per tablet Take 1 tablet by mouth Every 4 (Four) Hours As Needed for Headache. 20 tablet 0    clobetasol (TEMOVATE) 0.05 % cream APPLY TOPICALLY TO THE SCALP TWICE DAILY AS NEEDED       HYDROcodone-acetaminophen (Norco) 5-325 MG per tablet Take 1 tablet by mouth Every 4 (Four) Hours As Needed for Moderate Pain. 18 tablet 0    losartan (COZAAR) 25 MG tablet Take 1 tablet by mouth Daily.      metoprolol succinate XL (TOPROL-XL) 50 MG 24 hr tablet Take 2 tablets by mouth Every Evening.      ondansetron ODT (ZOFRAN-ODT) 4 MG disintegrating tablet DISSOLVE 1 TABLET ON THE TONGUE EVERY 8 HOURS AS NEEDED FOR NAUSEA OR VOMITING 15 tablet 3    phenazopyridine (Pyridium) 200 MG tablet Take 1 tablet by mouth 3 (Three) Times a Day As Needed for Bladder Spasms. 6 tablet 0    pravastatin (PRAVACHOL) 20 MG tablet Take 1 tablet by mouth Every Evening.      pravastatin (PRAVACHOL) 40 MG tablet Take 1 tablet by mouth Daily.       No facility-administered medications prior to visit.       Opioid medication/s are on active medication list.  and I have evaluated her active treatment plan and pain score trends (see table).  There were no vitals filed for this visit.  I have reviewed the chart for potential of high risk medication and harmful drug interactions in the elderly.          Aspirin is on active medication list. Aspirin use is indicated based on review of current medical condition/s. Pros and cons of this therapy have been discussed today. Benefits of this medication outweigh potential harm.  Patient has been encouraged to continue taking this medication.  .      Patient Active Problem List   Diagnosis    Spinal stenosis in cervical region    Essential hypertension    Hyperlipidemia    Pulmonary embolism (HCC)    S/P cervical spinal fusion    Tachycardia, unspecified    Type 2 diabetes mellitus without complication (HCC)    Vitamin D deficiency    Impingement syndrome of right shoulder    Impingement syndrome of left shoulder    Intra-abdominal adhesions    Left low back pain    Neck pain    Arthritis    Recurrent UTI    Interstitial cystitis (chronic) with hematuria    Personal history of endometriosis     "Hemangioma of other sites    Sensation of pressure in bladder area    Personal history of renal calculi    Pulmonary nodule less than 6 mm determined by computed tomography of lung    Fatty infiltration of liver     Advance Care Planning   Advance Care Planning     Advance Directive is not on file.  ACP discussion was held with the patient during this visit. Patient does not have an advance directive, declines further assistance.     Objective    Vitals:    09/15/23 1459   BP: 150/80   BP Location: Left arm   Patient Position: Sitting   Cuff Size: Large Adult   Pulse: 89   Temp: 98.2 °F (36.8 °C)   TempSrc: Temporal   SpO2: 99%   Weight: 70.6 kg (155 lb 9.6 oz)   Height: 162.6 cm (64\")     Estimated body mass index is 26.71 kg/m² as calculated from the following:    Height as of this encounter: 162.6 cm (64\").    Weight as of this encounter: 70.6 kg (155 lb 9.6 oz).    BMI is >= 25 and <30. (Overweight) The following options were offered after discussion;: weight loss educational material (shared in after visit summary), exercise counseling/recommendations, and nutrition counseling/recommendations      Does the patient have evidence of cognitive impairment? No    Lab Results   Component Value Date    TRIG 97 2023    HDL 58 2023     (H) 2023    VLDL 18 2023    HGBA1C 5.30 2023        HEALTH RISK ASSESSMENT    Smoking Status:  Social History     Tobacco Use   Smoking Status Never   Smokeless Tobacco Never     Alcohol Consumption:  Social History     Substance and Sexual Activity   Alcohol Use Not Currently     Fall Risk Screen:    STEADI Fall Risk Assessment was completed, and patient is at LOW risk for falls.Assessment completed on:2023    Depression Screenin/15/2023     3:14 PM   PHQ-2/PHQ-9 Depression Screening   Little Interest or Pleasure in Doing Things 0-->not at all   Feeling Down, Depressed or Hopeless 0-->not at all   PHQ-9: Brief Depression Severity Measure " Score 0       Health Habits and Functional and Cognitive Screenin/15/2023     3:14 PM   Functional & Cognitive Status   Do you have difficulty preparing food and eating? No   Do you have difficulty bathing yourself, getting dressed or grooming yourself? No   Do you have difficulty using the toilet? No   Do you have difficulty moving around from place to place? No   Do you have trouble with steps or getting out of a bed or a chair? No   Current Diet Low Carb Diet   Dental Exam Up to date   Eye Exam Up to date   Exercise (times per week) 5 times per week   Current Exercises Include Cardiovascular Workout;House Cleaning;Light Weights   Do you need help using the phone?  No   Are you deaf or do you have serious difficulty hearing?  No   Do you need help to go to places out of walking distance? No   Do you need help shopping? No   Do you need help preparing meals?  No   Do you need help with housework?  No   Do you need help with laundry? No   Do you need help taking your medications? No   Do you need help managing money? No   Do you ever drive or ride in a car without wearing a seat belt? No   Have you felt unusual stress, anger or loneliness in the last month? No   Who do you live with? Spouse   If you need help, do you have trouble finding someone available to you? No   Have you been bothered in the last four weeks by sexual problems? No   Do you have difficulty concentrating, remembering or making decisions? No       Age-appropriate Screening Schedule:  Refer to the list below for future screening recommendations based on patient's age, sex and/or medical conditions. Orders for these recommended tests are listed in the plan section. The patient has been provided with a written plan.    Health Maintenance   Topic Date Due    ZOSTER VACCINE (1 of 2) Never done    DIABETIC FOOT EXAM  Never done    DIABETIC EYE EXAM  Never done    BMI FOLLOWUP  2023    COVID-19 Vaccine (1) 2023 (Originally 1956)  "   Pneumococcal Vaccine 65+ (1 - PCV) 09/17/2024 (Originally 11/17/1961)    INFLUENZA VACCINE  10/01/2023    HEMOGLOBIN A1C  03/11/2024    MAMMOGRAM  09/07/2024    LIPID PANEL  09/11/2024    URINE MICROALBUMIN  09/11/2024    ANNUAL WELLNESS VISIT  09/15/2024    DXA SCAN  05/25/2025    COLORECTAL CANCER SCREENING  09/29/2027    TDAP/TD VACCINES (2 - Td or Tdap) 08/11/2030    HEPATITIS C SCREENING  Completed                  CMS Preventative Services Quick Reference  Risk Factors Identified During Encounter  Immunizations Discussed/Encouraged: Influenza, Prevnar 20 (Pneumococcal 20-valent conjugate), Shingrix, and COVID19  The above risks/problems have been discussed with the patient.  Pertinent information has been shared with the patient in the After Visit Summary.  An After Visit Summary and PPPS were made available to the patient.    Follow Up:   Next Medicare Wellness visit to be scheduled in 1 year.       Additional E&M Note during same encounter follows:  Patient has multiple medical problems which are significant and separately identifiable that require additional work above and beyond the Medicare Wellness Visit.      Chief Complaint  Medicare Wellness-subsequent (Medicare wellness.)    Subjective        HPI  Gloria Stewart is also being seen today for follow-up of diabetes,  hyperlipidemia and vitamin D deficiency. Negative for chest pain, palpitations and shortness of air. She had outpatient surgery this summer and had endometriosis removed and feeling better by Dr. Wilhelm, gynecology.     Followed by Dr. Tay and he refills her BP meds and pravastatin.  Seen by Dr. Wilson Priest and was recommended to have surgery for cervical stenosis but is waiting. Going to pain clinic for epidural injections.      Objective   Vital Signs:  /80 (BP Location: Left arm, Patient Position: Sitting, Cuff Size: Large Adult)   Pulse 89   Temp 98.2 °F (36.8 °C) (Temporal)   Ht 162.6 cm (64\")   Wt 70.6 kg (155 lb 9.6 " oz)   SpO2 99%   BMI 26.71 kg/m²     Physical Exam  Vitals reviewed.   Constitutional:       General: She is not in acute distress.  HENT:      Head: Normocephalic and atraumatic.      Right Ear: Tympanic membrane and ear canal normal.      Left Ear: Tympanic membrane and ear canal normal.   Eyes:      Conjunctiva/sclera: Conjunctivae normal.   Cardiovascular:      Rate and Rhythm: Normal rate and regular rhythm.      Heart sounds: Normal heart sounds. No murmur heard.  Pulmonary:      Effort: Pulmonary effort is normal.      Breath sounds: Normal breath sounds. No wheezing, rhonchi or rales.   Abdominal:      General: There is no distension.      Palpations: Abdomen is soft. There is no mass.      Tenderness: There is no abdominal tenderness.   Musculoskeletal:      Right lower leg: No edema.      Left lower leg: No edema.   Lymphadenopathy:      Cervical: No cervical adenopathy.   Skin:     General: Skin is warm and dry.      Coloration: Skin is not jaundiced or pale.   Neurological:      General: No focal deficit present.      Mental Status: She is alert.   Psychiatric:         Mood and Affect: Mood normal.         Thought Content: Thought content normal.        The following data was reviewed by: LEATHA To on 09/15/2023:  CMP          11/3/2022    09:03 7/20/2023    09:24 9/11/2023    09:35   CMP   Glucose 98  93  105    BUN 11  16  14    Creatinine 0.90  1.00  0.92    EGFR 71.1  62.3  68.8    Sodium 140  141  141    Potassium 4.6  5.4  4.7    Chloride 106  105  107    Calcium 9.5  10.1  9.9    Total Protein 7.4  7.6  7.4    Albumin 4.60  4.6  4.6    Globulin 2.8  3.0  2.8    Total Bilirubin 0.5  0.7  0.5    Alkaline Phosphatase 41  43  46    AST (SGOT) 36  34  35    ALT (SGPT) 37  40  35    Albumin/Globulin Ratio 1.6  1.5  1.6    BUN/Creatinine Ratio 12.2  16.0  15.2    Anion Gap 11.0  9.1  8.0      CBC w/diff          8/1/2023    13:36 9/11/2023    09:35   CBC w/Diff   WBC 4.3     4.98    RBC  4.24     4.15    Hemoglobin 14.2     14.2    Hematocrit 41.8     40.9    MCV 98.6     98.6    MCH 33.5     34.2    MCHC 34.0     34.7    RDW 12.4     11.8    Platelets 210     221    Neutrophil Rel % 60.6     68.3    Immature Granulocyte Rel % 0.2     0.2    Lymphocyte Rel % 29.2     20.5    Monocyte Rel % 8.9     7.4    Eosinophil Rel % 0.9     3.2    Basophil Rel % 0.2     0.4       Details          This result is from an external source.             Lipid Panel          11/3/2022    09:03 7/20/2023    09:24 9/11/2023    09:35   Lipid Panel   Total Cholesterol 207  222  186    Triglycerides 139  92  97    HDL Cholesterol 53  57  58    VLDL Cholesterol 25  16  18    LDL Cholesterol  129  149  110    LDL/HDL Ratio 2.38  2.57  1.87      TSH          9/11/2023    09:35   TSH   TSH 3.080      A1C Last 3 Results          9/11/2023    09:35   HGBA1C Last 3 Results   Hemoglobin A1C 5.30      Microalbumin          9/11/2023    09:35   Microalbumin   Microalbumin, Urine 3.1                 Assessment and Plan   Diagnoses and all orders for this visit:    1. Encounter for annual wellness exam in Medicare patient (Primary)    2. Type 2 diabetes mellitus without complication, without long-term current use of insulin  -     Cancel: Hemoglobin A1c; Future  -     Cancel: Folate; Future  -     Cancel: Vitamin B12; Future  -     Vitamin B12; Future  -     Folate; Future  -     Hemoglobin A1c; Future    3. Vitamin D deficiency  -     Cancel: Vitamin D,25-Hydroxy; Future  -     Vitamin D,25-Hydroxy; Future    4. Hyperlipidemia, unspecified hyperlipidemia type  -     Cancel: Lipid Panel; Future  -     Lipid Panel; Future    5. Essential hypertension  -     Cancel: Comprehensive Metabolic Panel; Future  -     Cancel: CBC & Differential; Future  -     Cancel: T4, Free; Future  -     Cancel: TSH; Future  -     Cancel: Magnesium; Future  -     Magnesium; Future  -     Comprehensive Metabolic Panel; Future  -     CBC & Differential;  Future  -     T4, Free; Future  -     TSH; Future      Annual exam: Care gaps reviewed.    Type 2 diabetes: Diet controlled.  HA1C normal level.  Continue to monitor.    Vitamin D deficiency: Normal level continue to monitor.    Hyperlipidemia: Lipid panel improved.  Liver enzymes unremarkable.  Stable on pravastatin 40 mg daily.    Hypertension: Stable on Toprol XL 50 mg daily and losartan 25 mg daily.              Follow Up   Return in about 1 year (around 9/15/2024) for Annual physical.  Patient was given instructions and counseling regarding her condition or for health maintenance advice. Please see specific information pulled into the AVS if appropriate.

## 2023-09-15 ENCOUNTER — OFFICE VISIT (OUTPATIENT)
Dept: INTERNAL MEDICINE | Facility: CLINIC | Age: 68
End: 2023-09-15
Payer: MEDICARE

## 2023-09-15 VITALS
TEMPERATURE: 98.2 F | HEART RATE: 89 BPM | WEIGHT: 155.6 LBS | HEIGHT: 64 IN | DIASTOLIC BLOOD PRESSURE: 80 MMHG | OXYGEN SATURATION: 99 % | BODY MASS INDEX: 26.56 KG/M2 | SYSTOLIC BLOOD PRESSURE: 150 MMHG

## 2023-09-15 DIAGNOSIS — E78.5 HYPERLIPIDEMIA, UNSPECIFIED HYPERLIPIDEMIA TYPE: Chronic | ICD-10-CM

## 2023-09-15 DIAGNOSIS — Z00.00 ENCOUNTER FOR ANNUAL WELLNESS EXAM IN MEDICARE PATIENT: Primary | ICD-10-CM

## 2023-09-15 DIAGNOSIS — E55.9 VITAMIN D DEFICIENCY: ICD-10-CM

## 2023-09-15 DIAGNOSIS — I10 ESSENTIAL HYPERTENSION: Chronic | ICD-10-CM

## 2023-09-15 DIAGNOSIS — E11.9 TYPE 2 DIABETES MELLITUS WITHOUT COMPLICATION, WITHOUT LONG-TERM CURRENT USE OF INSULIN: ICD-10-CM

## 2023-09-15 RX ORDER — PRAVASTATIN SODIUM 40 MG
40 TABLET ORAL DAILY
COMMUNITY

## 2023-09-19 ENCOUNTER — HOSPITAL ENCOUNTER (OUTPATIENT)
Dept: MAMMOGRAPHY | Facility: HOSPITAL | Age: 68
Discharge: HOME | End: 2023-09-19
Admitting: OBSTETRICS & GYNECOLOGY
Payer: MEDICARE

## 2023-09-19 DIAGNOSIS — Z12.31 SCREENING MAMMOGRAM FOR BREAST CANCER: ICD-10-CM

## 2023-09-19 PROCEDURE — 77063 BREAST TOMOSYNTHESIS BI: CPT

## 2023-09-19 PROCEDURE — 77067 SCR MAMMO BI INCL CAD: CPT

## 2024-02-12 ENCOUNTER — TRANSCRIBE ORDERS (OUTPATIENT)
Dept: ADMINISTRATIVE | Facility: HOSPITAL | Age: 69
End: 2024-02-12
Payer: MEDICARE

## 2024-02-12 DIAGNOSIS — Z12.31 SCREENING MAMMOGRAM FOR BREAST CANCER: Primary | ICD-10-CM

## 2024-03-28 ENCOUNTER — OFFICE VISIT (OUTPATIENT)
Dept: CARDIOLOGY | Facility: CLINIC | Age: 69
End: 2024-03-28
Payer: MEDICARE

## 2024-03-28 VITALS
SYSTOLIC BLOOD PRESSURE: 144 MMHG | WEIGHT: 158.8 LBS | HEART RATE: 80 BPM | HEIGHT: 64 IN | DIASTOLIC BLOOD PRESSURE: 92 MMHG | BODY MASS INDEX: 27.11 KG/M2

## 2024-03-28 DIAGNOSIS — I10 ESSENTIAL HYPERTENSION: ICD-10-CM

## 2024-03-28 DIAGNOSIS — R00.2 PALPITATIONS: Primary | ICD-10-CM

## 2024-03-28 PROCEDURE — 3080F DIAST BP >= 90 MM HG: CPT | Performed by: INTERNAL MEDICINE

## 2024-03-28 PROCEDURE — 3077F SYST BP >= 140 MM HG: CPT | Performed by: INTERNAL MEDICINE

## 2024-03-28 PROCEDURE — 99203 OFFICE O/P NEW LOW 30 MIN: CPT | Performed by: INTERNAL MEDICINE

## 2024-03-28 NOTE — PROGRESS NOTES
Chief Complaint  Establish Care      History of Present Illness  Gloria Stewart presents to [facility] CARDIOLOGY    This is a very pleasant 68-year-old lady with hypertension, PACs/PVCs, presents to clinic to establish new cardiologist.  Her cardiologist had recently retired.  She is feeling well overall.  She has no complaints or concerns today.  She remains physically active and exercises on regular basis without extraordinary limitations.  She has no chest discomfort or dyspnea.  She has no palpitations, dizziness, presyncope or syncope.    Past Medical History:   Diagnosis Date    Cervical radiculopathy 11/10/2020    Cervicalgia     Diabetes mellitus     HTN (hypertension)     Lumbago          Current Outpatient Medications:     aspirin 81 MG EC tablet, Take 1 tablet by mouth Daily., Disp: , Rfl:     losartan (COZAAR) 25 MG tablet, Take 1 tablet by mouth Daily., Disp: , Rfl:     metoprolol succinate XL (TOPROL-XL) 50 MG 24 hr tablet, Take 2 tablets by mouth Every Evening., Disp: , Rfl:     ondansetron ODT (ZOFRAN-ODT) 4 MG disintegrating tablet, DISSOLVE 1 TABLET ON THE TONGUE EVERY 8 HOURS AS NEEDED FOR NAUSEA OR VOMITING, Disp: 15 tablet, Rfl: 3    PROGESTERONE PO, Take  by mouth., Disp: , Rfl:     TESTOSTERONE COMPOUNDING KIT TD, Place  on the skin as directed by provider., Disp: , Rfl:     Medications Discontinued During This Encounter   Medication Reason    butalbital-acetaminophen-caffeine (Esgic) -40 MG per tablet *Therapy completed    clobetasol (TEMOVATE) 0.05 % cream *Therapy completed    HYDROcodone-acetaminophen (Norco) 5-325 MG per tablet *Therapy completed    phenazopyridine (Pyridium) 200 MG tablet *Therapy completed    pravastatin (PRAVACHOL) 40 MG tablet *Therapy completed     No Known Allergies     Social History     Tobacco Use    Smoking status: Never    Smokeless tobacco: Never   Vaping Use    Vaping status: Never Used   Substance Use Topics    Alcohol use: Not  "Currently    Drug use: Never       Family History   Problem Relation Age of Onset    Heart disease Other         UNSPECIFIED        Objective     /92   Pulse 80   Ht 162.6 cm (64.02\")   Wt 72 kg (158 lb 12.8 oz)   BMI 27.24 kg/m²       Physical Exam  Constitutional:       General: Awake. Not in acute distress.     Appearance: Normal appearance.   Neck:      Vascular: No carotid bruit, hepatojugular reflux or JVD.   Cardiovascular:      Rate and Rhythm: Normal rate and regular rhythm.      Chest Wall: PMI is not displaced.      Heart sounds: Normal heart sounds, S1 normal and S2 normal. No murmur heard.   No friction rub. No gallop. No S3 or S4 sounds.    Pulmonary:      Effort: Pulmonary effort is normal.      Breath sounds: Normal breath sounds. No wheezing, rhonchi or rales.   Ext.      No edema.   Skin:     General: Skin is warm and dry.      Coloration: Skin is not cyanotic.      Findings: No petechiae or rash.   Neurological:      Mental Status: Alert and oriented x 3  Psychiatric:         Behavior: Behavior is cooperative.       Result Review :     No results found for: \"PROBNP\"  CMP          7/20/2023    09:24 9/11/2023    09:35   CMP   Glucose 93  105    BUN 16  14    Creatinine 1.00  0.92    EGFR 62.3  68.8    Sodium 141  141    Potassium 5.4  4.7    Chloride 105  107    Calcium 10.1  9.9    Total Protein 7.6  7.4    Albumin 4.6  4.6    Globulin 3.0  2.8    Total Bilirubin 0.7  0.5    Alkaline Phosphatase 43  46    AST (SGOT) 34  35    ALT (SGPT) 40  35    Albumin/Globulin Ratio 1.5  1.6    BUN/Creatinine Ratio 16.0  15.2    Anion Gap 9.1  8.0      CBC w/diff          8/1/2023    13:36 9/11/2023    09:35   CBC w/Diff   WBC 4.3     4.98    RBC 4.24     4.15    Hemoglobin 14.2     14.2    Hematocrit 41.8     40.9    MCV 98.6     98.6    MCH 33.5     34.2    MCHC 34.0     34.7    RDW 12.4     11.8    Platelets 210     221    Neutrophil Rel % 60.6     68.3    Immature Granulocyte Rel % 0.2     0.2  " "  Lymphocyte Rel % 29.2     20.5    Monocyte Rel % 8.9     7.4    Eosinophil Rel % 0.9     3.2    Basophil Rel % 0.2     0.4       Details          This result is from an external source.              Lab Results   Component Value Date    TSH 3.080 09/11/2023      Lab Results   Component Value Date    FREET4 1.14 09/11/2023      No results found for: \"DDIMERQUANT\"  No results found for: \"MG\"   No results found for: \"DIGOXIN\"   No results found for: \"TROPONINT\"   No results found for: \"POCTROP\"(       Lipid Panel          7/20/2023    09:24 9/11/2023    09:35   Lipid Panel   Total Cholesterol 222  186    Triglycerides 92  97    HDL Cholesterol 57  58    VLDL Cholesterol 16  18    LDL Cholesterol  149  110    LDL/HDL Ratio 2.57  1.87                No results found for this or any previous visit.                Diagnoses and all orders for this visit:    1. Palpitations (Primary)    2. Essential hypertension      Assessment:    Palpitations: She has history of palpitations related to PACs and PVCs as documented by previous Holter monitor.  Her palpitations resolved on metoprolol which will be continued.  Previous echocardiogram and stress test were unremarkable.    Essential hypertension: Her blood pressure is mildly elevated today.  She will be continued on current blood pressure regimen.  Continue blood pressure monitoring.    Follow Up       Return in about 6 months (around 9/28/2024).    Patient was given instructions and counseling regarding her condition or for health maintenance advice. Please see specific information pulled into the AVS if appropriate.           "

## 2024-08-28 ENCOUNTER — TRANSCRIBE ORDERS (OUTPATIENT)
Dept: ADMINISTRATIVE | Facility: HOSPITAL | Age: 69
End: 2024-08-28
Payer: MEDICARE

## 2024-08-28 DIAGNOSIS — M54.81 OCCIPITAL NEURALGIA, UNSPECIFIED LATERALITY: Primary | ICD-10-CM

## 2024-08-30 ENCOUNTER — TRANSCRIBE ORDERS (OUTPATIENT)
Dept: ADMINISTRATIVE | Facility: HOSPITAL | Age: 69
End: 2024-08-30
Payer: MEDICARE

## 2024-08-30 DIAGNOSIS — M54.81 CERVICO-OCCIPITAL NEURALGIA: Primary | ICD-10-CM

## 2024-09-12 ENCOUNTER — LAB (OUTPATIENT)
Dept: LAB | Facility: HOSPITAL | Age: 69
End: 2024-09-12
Payer: MEDICARE

## 2024-09-12 DIAGNOSIS — E78.5 HYPERLIPIDEMIA, UNSPECIFIED HYPERLIPIDEMIA TYPE: Chronic | ICD-10-CM

## 2024-09-12 DIAGNOSIS — E11.9 TYPE 2 DIABETES MELLITUS WITHOUT COMPLICATION, WITHOUT LONG-TERM CURRENT USE OF INSULIN: ICD-10-CM

## 2024-09-12 DIAGNOSIS — I10 ESSENTIAL HYPERTENSION: ICD-10-CM

## 2024-09-12 DIAGNOSIS — E55.9 VITAMIN D DEFICIENCY: ICD-10-CM

## 2024-09-12 LAB
25(OH)D3 SERPL-MCNC: 62.6 NG/ML (ref 30–100)
ALBUMIN SERPL-MCNC: 4.6 G/DL (ref 3.5–5.2)
ALBUMIN/GLOB SERPL: 1.6 G/DL
ALP SERPL-CCNC: 40 U/L (ref 39–117)
ALT SERPL W P-5'-P-CCNC: 41 U/L (ref 1–33)
ANION GAP SERPL CALCULATED.3IONS-SCNC: 11 MMOL/L (ref 5–15)
AST SERPL-CCNC: 37 U/L (ref 1–32)
BASOPHILS # BLD AUTO: 0.03 10*3/MM3 (ref 0–0.2)
BASOPHILS NFR BLD AUTO: 0.5 % (ref 0–1.5)
BILIRUB SERPL-MCNC: 0.7 MG/DL (ref 0–1.2)
BUN SERPL-MCNC: 16 MG/DL (ref 8–23)
BUN/CREAT SERPL: 16.3 (ref 7–25)
CALCIUM SPEC-SCNC: 10 MG/DL (ref 8.6–10.5)
CHLORIDE SERPL-SCNC: 100 MMOL/L (ref 98–107)
CHOLEST SERPL-MCNC: 196 MG/DL (ref 0–200)
CO2 SERPL-SCNC: 28 MMOL/L (ref 22–29)
CREAT SERPL-MCNC: 0.98 MG/DL (ref 0.57–1)
DEPRECATED RDW RBC AUTO: 44.2 FL (ref 37–54)
EGFRCR SERPLBLD CKD-EPI 2021: 63.4 ML/MIN/1.73
EOSINOPHIL # BLD AUTO: 0.11 10*3/MM3 (ref 0–0.4)
EOSINOPHIL NFR BLD AUTO: 1.9 % (ref 0.3–6.2)
ERYTHROCYTE [DISTWIDTH] IN BLOOD BY AUTOMATED COUNT: 12.1 % (ref 12.3–15.4)
FOLATE SERPL-MCNC: >20 NG/ML (ref 4.78–24.2)
GLOBULIN UR ELPH-MCNC: 2.8 GM/DL
GLUCOSE SERPL-MCNC: 77 MG/DL (ref 65–99)
HBA1C MFR BLD: 5.5 % (ref 4.8–5.6)
HCT VFR BLD AUTO: 45.1 % (ref 34–46.6)
HDLC SERPL-MCNC: 59 MG/DL (ref 40–60)
HGB BLD-MCNC: 15.2 G/DL (ref 12–15.9)
IMM GRANULOCYTES # BLD AUTO: 0.01 10*3/MM3 (ref 0–0.05)
IMM GRANULOCYTES NFR BLD AUTO: 0.2 % (ref 0–0.5)
LDLC SERPL CALC-MCNC: 117 MG/DL (ref 0–100)
LDLC/HDLC SERPL: 1.94 {RATIO}
LYMPHOCYTES # BLD AUTO: 1.63 10*3/MM3 (ref 0.7–3.1)
LYMPHOCYTES NFR BLD AUTO: 28.5 % (ref 19.6–45.3)
MAGNESIUM SERPL-MCNC: 2.1 MG/DL (ref 1.6–2.4)
MCH RBC QN AUTO: 33.5 PG (ref 26.6–33)
MCHC RBC AUTO-ENTMCNC: 33.7 G/DL (ref 31.5–35.7)
MCV RBC AUTO: 99.3 FL (ref 79–97)
MONOCYTES # BLD AUTO: 0.51 10*3/MM3 (ref 0.1–0.9)
MONOCYTES NFR BLD AUTO: 8.9 % (ref 5–12)
NEUTROPHILS NFR BLD AUTO: 3.43 10*3/MM3 (ref 1.7–7)
NEUTROPHILS NFR BLD AUTO: 60 % (ref 42.7–76)
NRBC BLD AUTO-RTO: 0 /100 WBC (ref 0–0.2)
PLATELET # BLD AUTO: 261 10*3/MM3 (ref 140–450)
PMV BLD AUTO: 9.8 FL (ref 6–12)
POTASSIUM SERPL-SCNC: 4.7 MMOL/L (ref 3.5–5.2)
PROT SERPL-MCNC: 7.4 G/DL (ref 6–8.5)
RBC # BLD AUTO: 4.54 10*6/MM3 (ref 3.77–5.28)
SODIUM SERPL-SCNC: 139 MMOL/L (ref 136–145)
T4 FREE SERPL-MCNC: 1.17 NG/DL (ref 0.92–1.68)
TRIGL SERPL-MCNC: 113 MG/DL (ref 0–150)
TSH SERPL DL<=0.05 MIU/L-ACNC: 4.42 UIU/ML (ref 0.27–4.2)
VIT B12 BLD-MCNC: 1294 PG/ML (ref 211–946)
VLDLC SERPL-MCNC: 20 MG/DL (ref 5–40)
WBC NRBC COR # BLD AUTO: 5.72 10*3/MM3 (ref 3.4–10.8)

## 2024-09-12 PROCEDURE — 82746 ASSAY OF FOLIC ACID SERUM: CPT

## 2024-09-12 PROCEDURE — 83036 HEMOGLOBIN GLYCOSYLATED A1C: CPT

## 2024-09-12 PROCEDURE — 82306 VITAMIN D 25 HYDROXY: CPT

## 2024-09-12 PROCEDURE — 80053 COMPREHEN METABOLIC PANEL: CPT

## 2024-09-12 PROCEDURE — 83735 ASSAY OF MAGNESIUM: CPT

## 2024-09-12 PROCEDURE — 80061 LIPID PANEL: CPT

## 2024-09-12 PROCEDURE — 84439 ASSAY OF FREE THYROXINE: CPT

## 2024-09-12 PROCEDURE — 36415 COLL VENOUS BLD VENIPUNCTURE: CPT

## 2024-09-12 PROCEDURE — 84443 ASSAY THYROID STIM HORMONE: CPT

## 2024-09-12 PROCEDURE — 85025 COMPLETE CBC W/AUTO DIFF WBC: CPT

## 2024-09-12 PROCEDURE — 82607 VITAMIN B-12: CPT

## 2024-09-16 ENCOUNTER — OFFICE VISIT (OUTPATIENT)
Dept: INTERNAL MEDICINE | Age: 69
End: 2024-09-16
Payer: MEDICARE

## 2024-09-16 VITALS
HEART RATE: 87 BPM | HEIGHT: 64 IN | BODY MASS INDEX: 27.49 KG/M2 | TEMPERATURE: 97.8 F | WEIGHT: 161 LBS | RESPIRATION RATE: 18 BRPM | SYSTOLIC BLOOD PRESSURE: 180 MMHG | DIASTOLIC BLOOD PRESSURE: 92 MMHG | OXYGEN SATURATION: 97 %

## 2024-09-16 DIAGNOSIS — G89.29 CHRONIC NECK AND BACK PAIN: ICD-10-CM

## 2024-09-16 DIAGNOSIS — E78.5 HYPERLIPIDEMIA, UNSPECIFIED HYPERLIPIDEMIA TYPE: Chronic | ICD-10-CM

## 2024-09-16 DIAGNOSIS — E11.9 TYPE 2 DIABETES MELLITUS WITHOUT COMPLICATION, UNSPECIFIED WHETHER LONG TERM INSULIN USE: Chronic | ICD-10-CM

## 2024-09-16 DIAGNOSIS — M54.2 CHRONIC NECK AND BACK PAIN: ICD-10-CM

## 2024-09-16 DIAGNOSIS — Z00.00 ENCOUNTER FOR ANNUAL WELLNESS EXAM IN MEDICARE PATIENT: Primary | ICD-10-CM

## 2024-09-16 DIAGNOSIS — M54.9 CHRONIC NECK AND BACK PAIN: ICD-10-CM

## 2024-09-16 DIAGNOSIS — E55.9 VITAMIN D DEFICIENCY: Chronic | ICD-10-CM

## 2024-09-16 DIAGNOSIS — I10 ESSENTIAL HYPERTENSION: Chronic | ICD-10-CM

## 2024-09-16 DIAGNOSIS — K21.9 GASTROESOPHAGEAL REFLUX DISEASE, UNSPECIFIED WHETHER ESOPHAGITIS PRESENT: ICD-10-CM

## 2024-09-16 PROCEDURE — 3044F HG A1C LEVEL LT 7.0%: CPT | Performed by: NURSE PRACTITIONER

## 2024-09-16 PROCEDURE — 99214 OFFICE O/P EST MOD 30 MIN: CPT | Performed by: NURSE PRACTITIONER

## 2024-09-16 PROCEDURE — 1159F MED LIST DOCD IN RCRD: CPT | Performed by: NURSE PRACTITIONER

## 2024-09-16 PROCEDURE — 3080F DIAST BP >= 90 MM HG: CPT | Performed by: NURSE PRACTITIONER

## 2024-09-16 PROCEDURE — G0439 PPPS, SUBSEQ VISIT: HCPCS | Performed by: NURSE PRACTITIONER

## 2024-09-16 PROCEDURE — 1160F RVW MEDS BY RX/DR IN RCRD: CPT | Performed by: NURSE PRACTITIONER

## 2024-09-16 PROCEDURE — 3077F SYST BP >= 140 MM HG: CPT | Performed by: NURSE PRACTITIONER

## 2024-09-16 RX ORDER — LOSARTAN POTASSIUM 25 MG/1
25 TABLET ORAL DAILY
Qty: 90 TABLET | Refills: 1 | Status: SHIPPED | OUTPATIENT
Start: 2024-09-16

## 2024-09-16 RX ORDER — METOPROLOL TARTRATE 50 MG
100 TABLET ORAL DAILY
COMMUNITY
End: 2024-09-16 | Stop reason: DRUGHIGH

## 2024-09-16 RX ORDER — METOPROLOL TARTRATE 100 MG
100 TABLET ORAL 2 TIMES DAILY
Qty: 90 TABLET | Refills: 1 | Status: SHIPPED | OUTPATIENT
Start: 2024-09-16

## 2024-09-16 RX ORDER — METOPROLOL TARTRATE 50 MG
100 TABLET ORAL DAILY
Status: CANCELLED | OUTPATIENT
Start: 2024-09-16

## 2024-09-20 ENCOUNTER — HOSPITAL ENCOUNTER (OUTPATIENT)
Dept: MAMMOGRAPHY | Facility: HOSPITAL | Age: 69
Discharge: HOME | End: 2024-09-20
Admitting: OBSTETRICS & GYNECOLOGY
Payer: MEDICARE

## 2024-09-20 DIAGNOSIS — Z12.31 SCREENING MAMMOGRAM FOR BREAST CANCER: ICD-10-CM

## 2024-09-20 PROCEDURE — 77063 BREAST TOMOSYNTHESIS BI: CPT

## 2024-09-20 PROCEDURE — 77067 SCR MAMMO BI INCL CAD: CPT

## 2024-09-30 NOTE — PROGRESS NOTES
Chief Complaint  Dizziness (68 year old female here today for dizziness. States that she has been getting nerve injections. An mri is scheduled for her neck but she is wanting to get a ct scan of her head and neck and the dr she normally sees cannot order that. )    Subjective      History of Present Illness  The patient is a 68-year-old female who presents for evaluation of dizziness.    She has been experiencing dizziness throughout the year, which is triggered by various movements such as standing up, moving her head, or lying on her back for extended periods. This dizziness often leads to nausea. She also reports a sensation of fluid in her ears and a feeling of unsteadiness.    Additionally, she reports pain in her neck, specifically under the ear, which she describes as a pulled muscle that tightens and shoots pain. This pain radiates from her neck to her head and shoulder. She has been receiving injections for this issue but is reluctant to continue due to the need for sedation during the procedure.    She also experiences headaches, which she describes as severe and affecting both sides of her head. These headaches were initially constant, preventing her from sleeping. She suspects these headaches may be related to her neck issues.    She reports no history of migraines or short-term memory problems.       Past Medical History:   Diagnosis Date    Cervical radiculopathy 11/10/2020    Cervicalgia     Diabetes mellitus     HTN (hypertension)     Lumbago         Past Surgical History:   Procedure Laterality Date    CERVICAL FUSION      CHOLECYSTECTOMY      HYSTERECTOMY      OTHER SURGICAL HISTORY      DISCECTOMY UNSPECIFIED SITE    STOMACH SURGERY          Social History     Tobacco Use   Smoking Status Never   Smokeless Tobacco Never        Patient Care Team:  Shannon Starr APRN as PCP - General (Nurse Practitioner)  Mahesh Dunn MD as Cardiologist (Cardiology)  Ryan Caballero MD  "(Anesthesiology)  Fredy Wilhelm MD as Consulting Physician (Obstetrics and Gynecology)  Latonya Rebolledo APRN as Nurse Practitioner (Nurse Practitioner)  Wilson Priest MD as Surgeon (Neurosurgery)  Kareem Boogie MD as Consulting Physician (Orthopedic Surgery)    No Known Allergies       Current Outpatient Medications:     aspirin 81 MG EC tablet, Take 1 tablet by mouth Daily., Disp: , Rfl:     esomeprazole (nexIUM) 20 MG capsule, Take 1 capsule by mouth Every Morning Before Breakfast., Disp: 90 capsule, Rfl: 3    losartan (COZAAR) 25 MG tablet, Take 1 tablet by mouth Daily., Disp: 90 tablet, Rfl: 1    metoprolol tartrate (Lopressor) 100 MG tablet, Take 1 tablet by mouth 2 (Two) Times a Day., Disp: 90 tablet, Rfl: 1    traMADol (ULTRAM) 50 MG tablet, Take 1 tablet by mouth Every 6 (Six) Hours As Needed., Disp: , Rfl:     Objective     Vitals:    10/01/24 1117   BP: 126/68   BP Location: Left arm   Patient Position: Sitting   Pulse: 65   Resp: 18   Temp: 98 °F (36.7 °C)   TempSrc: Temporal   SpO2: 97%   Weight: 74.2 kg (163 lb 9.6 oz)   Height: 162.6 cm (64.02\")        Wt Readings from Last 3 Encounters:   10/01/24 74.2 kg (163 lb 9.6 oz)   09/16/24 73 kg (161 lb)   03/28/24 72 kg (158 lb 12.8 oz)        BP Readings from Last 3 Encounters:   10/01/24 126/68   09/16/24 180/92   03/28/24 144/92        Physical Exam    Physical Exam  Vitals reviewed.   Constitutional:       General: She is not in acute distress.  HENT:      Head: Normocephalic and atraumatic.      Right Ear: Tympanic membrane, ear canal and external ear normal.      Left Ear: Tympanic membrane, ear canal and external ear normal.   Neck:      Vascular: No carotid bruit.   Pulmonary:      Effort: Pulmonary effort is normal.   Neurological:      General: No focal deficit present.      Mental Status: She is alert.   Psychiatric:         Thought Content: Thought content normal.                Result Review   The following data was reviewed by: " Shannon Matty, APRN on 10/01/2024:  [x]  Tests & Results  []  Hospitalization/Emergency Department/Urgent Care  []  Internal/External Consultant Notes       Assessment and Plan     Diagnoses and all orders for this visit:    1. Dizziness (Primary)  -     MRI Brain With & Without Contrast; Future  -     US Carotid Bilateral; Future    2. Chronic neck pain  -     MRI Brain With & Without Contrast; Future  -     US Carotid Bilateral; Future    3. Generalized headaches  -     MRI Brain With & Without Contrast; Future  -     US Carotid Bilateral; Future         Assessment & Plan  1. Dizziness.  She has been experiencing dizziness for the past year, which can occur when she stands up, moves her head, or even when she is still. The dizziness sometimes presents as vertigo, with the room spinning and causing nausea, and other times as a general feeling of unsteadiness. The differential diagnosis includes inner ear problems, musculoskeletal issues, or more serious conditions such as stroke or tumor. A brain MRI has been ordered to investigate the cause of the dizziness and headaches. Additionally, a Doppler study of the carotid arteries has been recommended to check for any vascular issues.    2. Vertigo.  The vertigo episodes are characterized by spinning sensations and nausea, often triggered by head movements or changes in position. The absence of tinnitus and the presence of tight muscles suggest that the vertigo might not be related to inner ear crystals. A brain MRI has been ordered to rule out any central causes of vertigo. She was advised to use Dramamine for symptomatic relief, specifically the formulation that does not cause drowsiness.    3. Neck Pain.  She reports significant neck pain, particularly on one side, which radiates to her head and shoulders. The pain is described as a tight, muscle-like sensation that worsens with activity. The pain management doctor has provided nerve block injections, which have  helped temporarily. The differential diagnosis includes musculoskeletal issues and potential nerve involvement. A brain MRI and a Doppler study of the carotid arteries have been ordered to further investigate the cause of the pain.    4. Headaches.  She has been experiencing severe headaches that start from the neck and radiate to the entire head. These headaches have been persistent and debilitating, affecting her daily activities. The differential diagnosis includes musculoskeletal issues, vascular problems, and potential central nervous system causes. A brain MRI has been ordered to investigate the cause of the headaches. She was advised to continue avoiding triggers and to use pain management strategies as needed.     Patient was given instructions and counseling regarding her condition or for health maintenance advice. Please see specific information pulled into the AVS if appropriate.     Follow Up   Return for Follow-up post test.    Patient or patient representative verbalized consent for the use of Ambient Listening during the visit with  LEATHA To for chart documentation. 10/1/2024  12:09 EDT    LEATHA To

## 2024-10-01 ENCOUNTER — OFFICE VISIT (OUTPATIENT)
Dept: INTERNAL MEDICINE | Age: 69
End: 2024-10-01
Payer: MEDICARE

## 2024-10-01 ENCOUNTER — TELEPHONE (OUTPATIENT)
Dept: INTERNAL MEDICINE | Age: 69
End: 2024-10-01

## 2024-10-01 VITALS
RESPIRATION RATE: 18 BRPM | OXYGEN SATURATION: 97 % | HEART RATE: 65 BPM | TEMPERATURE: 98 F | SYSTOLIC BLOOD PRESSURE: 126 MMHG | BODY MASS INDEX: 27.93 KG/M2 | WEIGHT: 163.6 LBS | HEIGHT: 64 IN | DIASTOLIC BLOOD PRESSURE: 68 MMHG

## 2024-10-01 DIAGNOSIS — G89.29 CHRONIC NECK PAIN: ICD-10-CM

## 2024-10-01 DIAGNOSIS — R42 DIZZINESS: Primary | ICD-10-CM

## 2024-10-01 DIAGNOSIS — R51.9 GENERALIZED HEADACHES: ICD-10-CM

## 2024-10-01 DIAGNOSIS — M54.9 CHRONIC NECK AND BACK PAIN: ICD-10-CM

## 2024-10-01 DIAGNOSIS — M54.2 CHRONIC NECK PAIN: ICD-10-CM

## 2024-10-01 DIAGNOSIS — M54.2 CHRONIC NECK AND BACK PAIN: ICD-10-CM

## 2024-10-01 DIAGNOSIS — G89.29 CHRONIC NECK AND BACK PAIN: ICD-10-CM

## 2024-10-01 PROCEDURE — 1159F MED LIST DOCD IN RCRD: CPT | Performed by: NURSE PRACTITIONER

## 2024-10-01 PROCEDURE — 3074F SYST BP LT 130 MM HG: CPT | Performed by: NURSE PRACTITIONER

## 2024-10-01 PROCEDURE — 3078F DIAST BP <80 MM HG: CPT | Performed by: NURSE PRACTITIONER

## 2024-10-01 PROCEDURE — 99214 OFFICE O/P EST MOD 30 MIN: CPT | Performed by: NURSE PRACTITIONER

## 2024-10-01 PROCEDURE — 1160F RVW MEDS BY RX/DR IN RCRD: CPT | Performed by: NURSE PRACTITIONER

## 2024-10-01 PROCEDURE — 3044F HG A1C LEVEL LT 7.0%: CPT | Performed by: NURSE PRACTITIONER

## 2024-10-01 RX ORDER — TRAMADOL HYDROCHLORIDE 50 MG/1
50 TABLET ORAL EVERY 6 HOURS PRN
COMMUNITY
Start: 2024-09-29

## 2024-10-03 ENCOUNTER — HOSPITAL ENCOUNTER (OUTPATIENT)
Dept: MRI IMAGING | Facility: HOSPITAL | Age: 69
Discharge: HOME | End: 2024-10-03
Admitting: ANESTHESIOLOGY
Payer: MEDICARE

## 2024-10-03 DIAGNOSIS — M54.81 OCCIPITAL NEURALGIA, UNSPECIFIED LATERALITY: ICD-10-CM

## 2024-10-03 PROCEDURE — A9577 INJ MULTIHANCE: HCPCS | Performed by: ANESTHESIOLOGY

## 2024-10-03 PROCEDURE — 0 GADOBENATE DIMEGLUMINE 529 MG/ML SOLUTION: Performed by: ANESTHESIOLOGY

## 2024-10-03 PROCEDURE — 72156 MRI NECK SPINE W/O & W/DYE: CPT

## 2024-10-03 RX ADMIN — GADOBENATE DIMEGLUMINE 16 ML: 529 INJECTION, SOLUTION INTRAVENOUS at 14:59

## 2024-11-08 ENCOUNTER — HOSPITAL ENCOUNTER (OUTPATIENT)
Dept: MRI IMAGING | Facility: HOSPITAL | Age: 69
Discharge: HOME | End: 2024-11-08
Payer: MEDICARE

## 2024-11-08 DIAGNOSIS — R51.9 GENERALIZED HEADACHES: ICD-10-CM

## 2024-11-08 DIAGNOSIS — G89.29 CHRONIC NECK PAIN: ICD-10-CM

## 2024-11-08 DIAGNOSIS — R42 DIZZINESS: Primary | ICD-10-CM

## 2024-11-08 DIAGNOSIS — M54.2 CHRONIC NECK PAIN: ICD-10-CM

## 2024-11-08 DIAGNOSIS — R42 DIZZINESS: ICD-10-CM

## 2024-11-08 LAB
CREAT BLDA-MCNC: 1 MG/DL (ref 0.6–1.3)
EGFRCR SERPLBLD CKD-EPI 2021: 61.9 ML/MIN/1.73

## 2024-11-08 PROCEDURE — 0 GADOBENATE DIMEGLUMINE 529 MG/ML SOLUTION: Performed by: NURSE PRACTITIONER

## 2024-11-08 PROCEDURE — 70553 MRI BRAIN STEM W/O & W/DYE: CPT

## 2024-11-08 PROCEDURE — 82565 ASSAY OF CREATININE: CPT

## 2024-11-08 PROCEDURE — A9577 INJ MULTIHANCE: HCPCS | Performed by: NURSE PRACTITIONER

## 2024-11-08 RX ADMIN — GADOBENATE DIMEGLUMINE 15 ML: 529 INJECTION, SOLUTION INTRAVENOUS at 11:34

## 2024-11-19 ENCOUNTER — TRANSCRIBE ORDERS (OUTPATIENT)
Dept: ADMINISTRATIVE | Facility: HOSPITAL | Age: 69
End: 2024-11-19
Payer: MEDICARE

## 2024-11-19 ENCOUNTER — HOSPITAL ENCOUNTER (OUTPATIENT)
Dept: CARDIOLOGY | Facility: HOSPITAL | Age: 69
Discharge: HOME | End: 2024-11-19
Admitting: NURSE PRACTITIONER
Payer: MEDICARE

## 2024-11-19 DIAGNOSIS — M47.816 LUMBAR SPONDYLOSIS: Primary | ICD-10-CM

## 2024-11-19 DIAGNOSIS — R42 DIZZINESS: ICD-10-CM

## 2024-11-19 DIAGNOSIS — G89.29 CHRONIC NECK PAIN: ICD-10-CM

## 2024-11-19 DIAGNOSIS — M54.2 CHRONIC NECK PAIN: ICD-10-CM

## 2024-11-19 DIAGNOSIS — R51.9 GENERALIZED HEADACHES: ICD-10-CM

## 2024-11-19 LAB
BH CV XLRA MEAS LEFT CAROTID BULB EDV: 21.2 CM/SEC
BH CV XLRA MEAS LEFT CAROTID BULB PSV: 68.8 CM/SEC
BH CV XLRA MEAS LEFT DIST CCA EDV: 23.4 CM/SEC
BH CV XLRA MEAS LEFT DIST CCA PSV: 71 CM/SEC
BH CV XLRA MEAS LEFT DIST ICA EDV: 41.4 CM/SEC
BH CV XLRA MEAS LEFT DIST ICA PSV: 98 CM/SEC
BH CV XLRA MEAS LEFT ICA/CCA RATIO: 1.4
BH CV XLRA MEAS LEFT MID ICA EDV: 36.2 CM/SEC
BH CV XLRA MEAS LEFT MID ICA PSV: 104.6 CM/SEC
BH CV XLRA MEAS LEFT PROX CCA EDV: 19.5 CM/SEC
BH CV XLRA MEAS LEFT PROX CCA PSV: 66.6 CM/SEC
BH CV XLRA MEAS LEFT PROX ECA EDV: 79.5 CM/SEC
BH CV XLRA MEAS LEFT PROX ECA PSV: 79.5 CM/SEC
BH CV XLRA MEAS LEFT PROX ICA EDV: 29.1 CM/SEC
BH CV XLRA MEAS LEFT PROX ICA PSV: 99.4 CM/SEC
BH CV XLRA MEAS LEFT VERTEBRAL A EDV: 15.4 CM/SEC
BH CV XLRA MEAS LEFT VERTEBRAL A PSV: 48.1 CM/SEC
BH CV XLRA MEAS RIGHT CAROTID BULB EDV: 15.3 CM/SEC
BH CV XLRA MEAS RIGHT CAROTID BULB PSV: 39.9 CM/SEC
BH CV XLRA MEAS RIGHT DIST CCA EDV: 16.3 CM/SEC
BH CV XLRA MEAS RIGHT DIST CCA PSV: 50 CM/SEC
BH CV XLRA MEAS RIGHT DIST ICA EDV: 33.8 CM/SEC
BH CV XLRA MEAS RIGHT DIST ICA PSV: 74.9 CM/SEC
BH CV XLRA MEAS RIGHT ICA/CCA RATIO: 1.49
BH CV XLRA MEAS RIGHT MID ICA EDV: 34 CM/SEC
BH CV XLRA MEAS RIGHT MID ICA PSV: 82.5 CM/SEC
BH CV XLRA MEAS RIGHT PROX CCA EDV: 17.5 CM/SEC
BH CV XLRA MEAS RIGHT PROX CCA PSV: 82 CM/SEC
BH CV XLRA MEAS RIGHT PROX ECA EDV: 14.7 CM/SEC
BH CV XLRA MEAS RIGHT PROX ECA PSV: 79.2 CM/SEC
BH CV XLRA MEAS RIGHT PROX ICA EDV: 30 CM/SEC
BH CV XLRA MEAS RIGHT PROX ICA PSV: 74.3 CM/SEC
BH CV XLRA MEAS RIGHT VERTEBRAL A EDV: 14.7 CM/SEC
BH CV XLRA MEAS RIGHT VERTEBRAL A PSV: 51.1 CM/SEC
LEFT ARM BP: NORMAL MMHG
RIGHT ARM BP: NORMAL MMHG

## 2024-11-19 PROCEDURE — 93880 EXTRACRANIAL BILAT STUDY: CPT

## 2024-12-30 ENCOUNTER — HOSPITAL ENCOUNTER (OUTPATIENT)
Dept: MRI IMAGING | Facility: HOSPITAL | Age: 69
Discharge: HOME | End: 2024-12-30
Admitting: NURSE PRACTITIONER
Payer: MEDICARE

## 2024-12-30 DIAGNOSIS — M47.816 LUMBAR SPONDYLOSIS: ICD-10-CM

## 2024-12-30 PROCEDURE — 72148 MRI LUMBAR SPINE W/O DYE: CPT

## 2024-12-30 RX ORDER — METOPROLOL TARTRATE 100 MG/1
100 TABLET ORAL 2 TIMES DAILY
Qty: 90 TABLET | Refills: 1 | Status: SHIPPED | OUTPATIENT
Start: 2024-12-30

## 2025-02-28 RX ORDER — METOPROLOL TARTRATE 100 MG/1
100 TABLET ORAL 2 TIMES DAILY
Qty: 90 TABLET | Refills: 1 | Status: SHIPPED | OUTPATIENT
Start: 2025-02-28

## 2025-04-01 RX ORDER — LOSARTAN POTASSIUM 25 MG/1
25 TABLET ORAL DAILY
Qty: 90 TABLET | Refills: 1 | Status: SHIPPED | OUTPATIENT
Start: 2025-04-01

## 2025-06-24 ENCOUNTER — OFFICE VISIT (OUTPATIENT)
Dept: CARDIOLOGY | Facility: CLINIC | Age: 70
End: 2025-06-24
Payer: MEDICARE

## 2025-06-24 VITALS
BODY MASS INDEX: 28.03 KG/M2 | OXYGEN SATURATION: 100 % | HEIGHT: 64 IN | HEART RATE: 63 BPM | SYSTOLIC BLOOD PRESSURE: 166 MMHG | DIASTOLIC BLOOD PRESSURE: 74 MMHG | WEIGHT: 164.2 LBS

## 2025-06-24 DIAGNOSIS — I10 ESSENTIAL HYPERTENSION: Primary | ICD-10-CM

## 2025-06-24 DIAGNOSIS — E78.49 OTHER HYPERLIPIDEMIA: ICD-10-CM

## 2025-06-24 DIAGNOSIS — I25.10 MILD CAD: ICD-10-CM

## 2025-06-24 PROCEDURE — 1159F MED LIST DOCD IN RCRD: CPT | Performed by: INTERNAL MEDICINE

## 2025-06-24 PROCEDURE — 93000 ELECTROCARDIOGRAM COMPLETE: CPT | Performed by: INTERNAL MEDICINE

## 2025-06-24 PROCEDURE — 99214 OFFICE O/P EST MOD 30 MIN: CPT | Performed by: INTERNAL MEDICINE

## 2025-06-24 PROCEDURE — 3078F DIAST BP <80 MM HG: CPT | Performed by: INTERNAL MEDICINE

## 2025-06-24 PROCEDURE — 1160F RVW MEDS BY RX/DR IN RCRD: CPT | Performed by: INTERNAL MEDICINE

## 2025-06-24 PROCEDURE — 3077F SYST BP >= 140 MM HG: CPT | Performed by: INTERNAL MEDICINE

## 2025-06-24 RX ORDER — ATORVASTATIN CALCIUM 20 MG/1
20 TABLET, FILM COATED ORAL DAILY
Qty: 90 TABLET | Refills: 3 | Status: SHIPPED | OUTPATIENT
Start: 2025-06-24

## 2025-06-24 RX ORDER — METHOCARBAMOL 750 MG/1
750 TABLET, FILM COATED ORAL 3 TIMES DAILY PRN
COMMUNITY

## 2025-06-24 RX ORDER — METOPROLOL SUCCINATE 50 MG/1
50 TABLET, EXTENDED RELEASE ORAL
COMMUNITY
End: 2025-06-24

## 2025-06-24 NOTE — PROGRESS NOTES
[facility]  INTERVENTIONAL CARDIOLOGY FOLLOW-UP PROGRESS NOTE        Chief Complaint  Follow-up (1 year) and Cardiac Clearance    Subjective            History of Present Illness  Gloria Stewart is a 69 y.o. female who presents to [facility] CARDIOLOG[facility]    History of Present Illness  The patient presents for evaluation of blood pressure, coronary artery disease, and cholesterol management.    She reports no significant changes in her health status over the past year. Blood pressure typically elevates during doctor's visits, a phenomenon attributed to anxiety. Occasional monitoring of blood pressure at home usually reads around 120/70 or 72. Currently, she is on losartan 25 mg, taken once daily in the morning.    She has not been on aspirin therapy for an extended period and has no history of mini-stroke, stroke, or cardiac issues. No chest pain or shortness of breath is reported, and daily activities are performed without any limitations. A cardiac catheterization procedure was performed in 2007, preceded by an EKG, which indicated a 20-30% blockage in LAD and diagonal.    She is planning to have surgery. She visited her gynecologist on 06/02/2025 and reported pain in her abdomen and right side. An exam revealed a mass. She has had trouble with adhesions since 1994. A hysterectomy was performed in 1984, and she has endometriosis.    PAST SURGICAL HISTORY:  Hysterectomy (1984)    SOCIAL HISTORY  Marital Status:          Past History:  Past Medical History:   Diagnosis Date    Cervical radiculopathy 11/10/2020    Cervicalgia     Diabetes mellitus     HTN (hypertension)     Lumbago        Medical History:  Past Medical History:   Diagnosis Date    Cervical radiculopathy 11/10/2020    Cervicalgia     Diabetes mellitus     HTN (hypertension)     Lumbago        Surgical History:   has a past surgical history that includes Cervical fusion; Cholecystectomy; Other surgical history;  "Hysterectomy; Stomach surgery; Spine surgery (8/2012); and Ablation of dysrhythmic focus (5/2025).     Family History:   family history includes Heart attack in her brother and sister; Heart disease in her brother, mother, and another family member.     Social History:   reports that she has never smoked. She has never used smokeless tobacco. She reports that she does not currently use alcohol. She reports that she does not use drugs.    Allergies:   Patient has no known allergies.    Current Outpatient Medications on File Prior to Visit   Medication Sig    losartan (COZAAR) 25 MG tablet TAKE 1 TABLET BY MOUTH DAILY    methocarbamol (ROBAXIN) 750 MG tablet Take 1 tablet by mouth 3 (Three) Times a Day As Needed. Pt states she only takes half    metoprolol tartrate (LOPRESSOR) 100 MG tablet TAKE 1 TABLET BY MOUTH TWICE DAILY    traMADol (ULTRAM) 50 MG tablet Take 1 tablet by mouth Every 6 (Six) Hours As Needed.    aspirin 81 MG EC tablet Take 1 tablet by mouth Daily.    esomeprazole (nexIUM) 20 MG capsule Take 1 capsule by mouth Every Morning Before Breakfast.    [DISCONTINUED] metoprolol succinate XL (TOPROL-XL) 50 MG 24 hr tablet Take 1 tablet by mouth. (Patient not taking: Reported on 6/24/2025)     No current facility-administered medications on file prior to visit.          Review of Systems   Negative except as mentioned in HPI above.    Objective     /74   Pulse 63   Ht 162.6 cm (64.02\")   Wt 74.5 kg (164 lb 3.2 oz)   SpO2 100%   BMI 28.17 kg/m²       Physical Exam    General : Alert, awake, no acute distress  Neck : Supple, no carotid bruit, no jugular venous distention  CVS : Regular rate and rhythm, no murmur, rubs or gallops  Lungs: Clear to auscultation bilaterally, no crackles or rhonchi  Abdomen: Soft, nontender, bowel sounds heard in all 4 quadrants  Extremities: Warm, well-perfused, no pedal edema    Result Review :     The following data was reviewed by: Joanie Gomez MD on " 06/24/2025:    CMP          9/12/2024    07:59 11/8/2024    11:17   CMP   Glucose 77     BUN 16     Creatinine 0.98  1.00    EGFR 63.4  61.9    Sodium 139     Potassium 4.7     Chloride 100     Calcium 10.0     Total Protein 7.4     Albumin 4.6     Globulin 2.8     Total Bilirubin 0.7     Alkaline Phosphatase 40     AST (SGOT) 37     ALT (SGPT) 41     Albumin/Globulin Ratio 1.6     BUN/Creatinine Ratio 16.3     Anion Gap 11.0       CBC          9/12/2024    07:59   CBC   WBC 5.72    RBC 4.54    Hemoglobin 15.2    Hematocrit 45.1    MCV 99.3    MCH 33.5    MCHC 33.7    RDW 12.1    Platelets 261      TSH          9/12/2024    07:59   TSH   TSH 4.420      Lipid Panel          9/12/2024    07:59   Lipid Panel   Total Cholesterol 196    Triglycerides 113    HDL Cholesterol 59    VLDL Cholesterol 20    LDL Cholesterol  117    LDL/HDL Ratio 1.94       A1C Last 3 Results          9/12/2024    07:59   HGBA1C Last 3 Results   Hemoglobin A1C 5.50          Data reviewed: Cardiology studies    No results found for this or any previous visit.        ECG 12 Lead    Date/Time: 6/24/2025 2:00 PM  Performed by: Joanie Gomez MD    Authorized by: Joanie Gomez MD  Comparison: compared with previous ECG   Similar to previous ECG  Rhythm: sinus rhythm  Rate: normal  QRS axis: normal    Clinical impression: normal ECG        No results found for this or any previous visit.        ASCVD Score  The 10-year ASCVD risk score (Kurt URIAS, et al., 2019) is: 29%    Values used to calculate the score:      Age: 69 years      Sex: Female      Is Non- : No      Diabetic: Yes      Tobacco smoker: No      Systolic Blood Pressure: 166 mmHg      Is BP treated: Yes      HDL Cholesterol: 59 mg/dL      Total Cholesterol: 196 mg/dL         Assessment and Plan          Diagnoses and all orders for this visit:    1. Essential hypertension (Primary)    2. Other hyperlipidemia    3. Mild CAD    Other orders  -     atorvastatin  (LIPITOR) 20 MG tablet; Take 1 tablet by mouth Daily.  Dispense: 90 tablet; Refill: 3          Assessment & Plan  1.  Mild coronary artery disease:  - Resume aspirin 81 mg daily therapy indefinitely to prevent myocardial infarction or cerebrovascular accident.  - Continue taking baby aspirin, which is already available at home.    2. Blood pressure management:  - Monitor blood pressure daily for a minimum of 2 weeks and submit readings via ecomomhart or directly to the office.  - Continue taking losartan 25 mg in the morning with food.  - If home readings indicate hypertension, medication adjustments will be considered.    3. Cholesterol management:  - Prescribed atorvastatin 20 mg to manage cholesterol levels.  - Target LDL cholesterol level < 70 mg/dL.    4. Preoperative clearance:  - Patient is at low risk from cardiac standpoint for undergoing anticipated cardiac surgical procedure.    Follow-up  Follow up in 6 months.        Patient was educated on heart healthy diet, daily exercise and achieving optimal weight.     Follow Up     Return in about 6 months (around 12/24/2025) for Next scheduled follow up, With Ember Le.      Gloria Stewart  reports that she has never smoked. She has never used smokeless tobacco.          I spent 30 minutes caring for this patient on this date of service. This time includes time spent by me in the following activities:preparing for the visit, reviewing tests, obtaining and/or reviewing a separately obtained history, performing a medically appropriate examination and/or evaluation , counseling and educating the patient/family/caregiver, ordering medications, tests, or procedures, referring and communicating with other health care professionals , documenting information in the medical record, independently interpreting results and communicating that information with the patient/family/caregiver, and care coordination.    I have reviewed the family history, social history,  and past medical history for this patient. Previous information and data has been reviewed and updated as needed. I have reviewed and verified the chief complaint, history, and other documentation. The patient was interviewed and examined in the clinic and the chart reviewed. The previous observations, recommendations, and conclusions were reviewed including those of other providers.     The plan was discussed with the patient and/or family. The patient was given time to ask questions and these questions were answered. At the conclusion of their visit they had no additional questions or concerns and all questions were answered to their satisfaction.     Patient was given instructions and counseling regarding her condition or for health maintenance advice. Please see specific information pulled into the AVS if appropriate.      Patient or patient representative verbalized consent for the use of Ambient Listening during the visit with  Joanie Gomez MD for chart documentation. 6/24/2025  14:01 EDT      [facility] Gomez MD, [facility]  06/24/25  13:59 EDT    Dictated Utilizing Dragon Dictation

## 2025-06-26 ENCOUNTER — TELEPHONE (OUTPATIENT)
Dept: CARDIOLOGY | Facility: CLINIC | Age: 70
End: 2025-06-26
Payer: MEDICARE

## 2025-06-26 NOTE — TELEPHONE ENCOUNTER
Procedure:Lysis of Adhesion, resection mass with general anesthesia    Med Directive:Aspirin     PMH:mild CAD, HTN, HLD    Last Seen:06/24/2025

## 2025-07-17 ENCOUNTER — TELEPHONE (OUTPATIENT)
Dept: CARDIOLOGY | Facility: CLINIC | Age: 70
End: 2025-07-17
Payer: MEDICARE

## 2025-07-17 NOTE — TELEPHONE ENCOUNTER
Per Dr. Gomez:      BP looks well controlled.  Lets continue current management and follow-up as scheduled.  Thank you for doing the blood pressure log.       Attempted to call patient. No answer. Detailed VM left with return call requested if needed.

## 2025-07-21 RX ORDER — LOSARTAN POTASSIUM 25 MG/1
25 TABLET ORAL DAILY
Qty: 90 TABLET | Refills: 1 | Status: SHIPPED | OUTPATIENT
Start: 2025-07-21

## 2025-08-06 ENCOUNTER — TRANSCRIBE ORDERS (OUTPATIENT)
Dept: ADMINISTRATIVE | Facility: HOSPITAL | Age: 70
End: 2025-08-06
Payer: MEDICARE

## 2025-08-06 DIAGNOSIS — Z12.31 BREAST CANCER SCREENING BY MAMMOGRAM: Primary | ICD-10-CM

## 2025-08-13 ENCOUNTER — READMISSION MANAGEMENT (OUTPATIENT)
Dept: CALL CENTER | Facility: HOSPITAL | Age: 70
End: 2025-08-13
Payer: MEDICARE

## 2025-08-14 ENCOUNTER — TRANSITIONAL CARE MANAGEMENT TELEPHONE ENCOUNTER (OUTPATIENT)
Dept: CALL CENTER | Facility: HOSPITAL | Age: 70
End: 2025-08-14
Payer: MEDICARE

## 2025-08-20 ENCOUNTER — OFFICE VISIT (OUTPATIENT)
Dept: INTERNAL MEDICINE | Age: 70
End: 2025-08-20
Payer: MEDICARE

## 2025-08-20 VITALS
SYSTOLIC BLOOD PRESSURE: 140 MMHG | HEIGHT: 64 IN | RESPIRATION RATE: 18 BRPM | WEIGHT: 160.8 LBS | TEMPERATURE: 97.4 F | BODY MASS INDEX: 27.45 KG/M2 | DIASTOLIC BLOOD PRESSURE: 78 MMHG | OXYGEN SATURATION: 97 % | HEART RATE: 79 BPM

## 2025-08-20 DIAGNOSIS — N89.8 VAGINAL MASS: ICD-10-CM

## 2025-08-20 DIAGNOSIS — N30.10 IC (INTERSTITIAL CYSTITIS): ICD-10-CM

## 2025-08-20 DIAGNOSIS — Z09 SURGICAL FOLLOW-UP CARE: Primary | ICD-10-CM

## 2025-08-20 RX ORDER — OXYCODONE AND ACETAMINOPHEN 5; 325 MG/1; MG/1
TABLET ORAL
COMMUNITY

## 2025-08-20 RX ORDER — METOPROLOL SUCCINATE 50 MG/1
100 TABLET, EXTENDED RELEASE ORAL DAILY
COMMUNITY

## 2025-08-20 RX ORDER — ONDANSETRON 4 MG/1
TABLET, ORALLY DISINTEGRATING ORAL
COMMUNITY